# Patient Record
Sex: MALE | Race: WHITE | Employment: UNEMPLOYED | URBAN - METROPOLITAN AREA
[De-identification: names, ages, dates, MRNs, and addresses within clinical notes are randomized per-mention and may not be internally consistent; named-entity substitution may affect disease eponyms.]

---

## 2020-07-07 ENCOUNTER — HOSPITAL ENCOUNTER (EMERGENCY)
Age: 30
Discharge: HOME | End: 2020-07-07
Attending: EMERGENCY MEDICINE
Payer: COMMERCIAL

## 2020-07-07 VITALS
OXYGEN SATURATION: 99 % | DIASTOLIC BLOOD PRESSURE: 95 MMHG | WEIGHT: 170 LBS | RESPIRATION RATE: 16 BRPM | BODY MASS INDEX: 22.53 KG/M2 | HEART RATE: 99 BPM | SYSTOLIC BLOOD PRESSURE: 154 MMHG | HEIGHT: 73 IN | TEMPERATURE: 98.7 F

## 2020-07-07 PROCEDURE — 6370000000 HC RX 637 (ALT 250 FOR IP): Performed by: STUDENT IN AN ORGANIZED HEALTH CARE EDUCATION/TRAINING PROGRAM

## 2020-07-07 PROCEDURE — 99282 EMERGENCY DEPT VISIT SF MDM: CPT

## 2020-07-07 RX ORDER — ACETAMINOPHEN 500 MG
1000 TABLET ORAL EVERY 6 HOURS PRN
Qty: 30 TABLET | Refills: 0 | Status: SHIPPED | OUTPATIENT
Start: 2020-07-07 | End: 2022-03-07

## 2020-07-07 RX ORDER — SULFAMETHOXAZOLE AND TRIMETHOPRIM 800; 160 MG/1; MG/1
1 TABLET ORAL 2 TIMES DAILY
Qty: 14 TABLET | Refills: 0 | Status: SHIPPED | OUTPATIENT
Start: 2020-07-07 | End: 2020-07-14

## 2020-07-07 RX ORDER — SULFAMETHOXAZOLE AND TRIMETHOPRIM 800; 160 MG/1; MG/1
1 TABLET ORAL ONCE
Status: COMPLETED | OUTPATIENT
Start: 2020-07-07 | End: 2020-07-07

## 2020-07-07 RX ORDER — IBUPROFEN 600 MG/1
600 TABLET ORAL EVERY 6 HOURS PRN
Qty: 30 TABLET | Refills: 0 | Status: SHIPPED | OUTPATIENT
Start: 2020-07-07 | End: 2022-03-07

## 2020-07-07 RX ADMIN — SULFAMETHOXAZOLE AND TRIMETHOPRIM 1 TABLET: 800; 160 TABLET ORAL at 23:06

## 2020-07-07 ASSESSMENT — PAIN DESCRIPTION - LOCATION: LOCATION: WRIST

## 2020-07-07 ASSESSMENT — PAIN DESCRIPTION - PAIN TYPE: TYPE: ACUTE PAIN

## 2020-07-07 ASSESSMENT — PAIN SCALES - GENERAL: PAINLEVEL_OUTOF10: 5

## 2020-07-07 ASSESSMENT — PAIN DESCRIPTION - ORIENTATION: ORIENTATION: LEFT

## 2020-07-08 ASSESSMENT — ENCOUNTER SYMPTOMS
CHEST TIGHTNESS: 0
SHORTNESS OF BREATH: 0
EYE DISCHARGE: 0
ABDOMINAL PAIN: 0
EYE REDNESS: 0

## 2020-07-08 NOTE — ED PROVIDER NOTES
[facility]  ED  Emergency Department Encounter  Emergency Medicine Resident     Pt Name: Jluis Law  MRN: [id number]  [place] 1990  Date of evaluation: 7/8/20  PCP:  No primary care provider on file. CHIEF COMPLAINT       Chief Complaint   Patient presents with    Abscess     left wrist        HISTORY OFPRESENT ILLNESS  (Location/Symptom, Timing/Onset, Context/Setting, Quality, Duration, Modifying Joanna Loyola.)      Jluis Law is a 27 y.o. male who presents with history of IV drug use with concern for abscess to left wrist.  Patient states he last injected here approximately a day and a half ago. Noticed some swelling as well as redness to the area. No history of abscesses in the past.  Denies any possibility of any needle fragment. Pain is a 5 out of 10. Left wrist.  Throbbing. Nonradiating. Worse with movement. PAST MEDICAL / SURGICAL / SOCIAL / FAMILY HISTORY      has no past medical history on file. has no past surgical history on file. Social History     Socioeconomic History    Marital status: Single     Spouse name: Not on file    Number of children: Not on file    Years of education: Not on file    Highest education level: Not on file   Occupational History    Not on file   Social Needs    Financial resource strain: Not on file    Food insecurity     Worry: Not on file     Inability: Not on file    Transportation needs     Medical: Not on file     Non-medical: Not on file   Tobacco Use    Smoking status: Current Every Day Smoker    Smokeless tobacco: Never Used   Substance and Sexual Activity    Alcohol use:  Yes    Drug use: Yes     Types: Marijuana     Comment: heroin     Sexual activity: Not on file   Lifestyle    Physical activity     Days per week: Not on file     Minutes per session: Not on file    Stress: Not on file   Relationships    Social connections     Talks on phone: Not on file     Gets together: Not on file     Attends Islam service: Not on file     Active member of club or organization: Not on file     Attends meetings of clubs or organizations: Not on file     Relationship status: Not on file    Intimate partner violence     Fear of current or ex partner: Not on file     Emotionally abused: Not on file     Physically abused: Not on file     Forced sexual activity: Not on file   Other Topics Concern    Not on file   Social History Narrative    Not on file       History reviewed. No pertinent family history. Allergies:  Benzocaine    Home Medications:  Prior to Admission medications    Medication Sig Start Date End Date Taking? Authorizing Provider   acetaminophen (APAP EXTRA STRENGTH) 500 MG tablet Take 2 tablets by mouth every 6 hours as needed for Pain 7/7/20  Yes Kaylee Araujo, DO   ibuprofen (ADVIL;MOTRIN) 600 MG tablet Take 1 tablet by mouth every 6 hours as needed for Pain 7/7/20  Yes Lorvenkat Araujo, DO   sulfamethoxazole-trimethoprim (BACTRIM DS) 800-160 MG per tablet Take 1 tablet by mouth 2 times daily for 7 days 7/7/20 7/14/20 Yes Kaylee Araujo, DO       REVIEW OF SYSTEMS    (2-9 systems for level 4, 10 or more for level 5)      Review of Systems   Constitutional: Negative for chills and fever. Eyes: Negative for discharge and redness. Respiratory: Negative for chest tightness and shortness of breath. Cardiovascular: Negative for chest pain. Gastrointestinal: Negative for abdominal pain. Genitourinary: Negative for dysuria and flank pain. Musculoskeletal: Negative for myalgias. Skin: Positive for rash. Allergic/Immunologic: Positive for environmental allergies. Neurological: Negative for headaches. Psychiatric/Behavioral: Negative for agitation and confusion. PHYSICAL EXAM   (up to 7 for level 4, 8 or more for level 5)     INITIAL VITALS:    height is 6' 1\" (1.854 m) and weight is 170 lb (77.1 kg). His oral temperature is 98.7 °F (37.1 °C).  His blood pressure is 154/95 (abnormal) and his pulse is 99. His respiration is 16 and oxygen saturation is 99%. Physical Exam  Vitals signs and nursing note reviewed. Constitutional:       Appearance: He is well-developed. HENT:      Head: Normocephalic and atraumatic. Nose: Nose normal.      Mouth/Throat:      Mouth: Mucous membranes are moist.   Eyes:      General: No scleral icterus. Conjunctiva/sclera: Conjunctivae normal.      Pupils: Pupils are equal, round, and reactive to light. Neck:      Musculoskeletal: Neck supple. Trachea: No tracheal deviation. Cardiovascular:      Rate and Rhythm: Normal rate and regular rhythm. Heart sounds: Normal heart sounds. No murmur. No friction rub. No gallop. Pulmonary:      Effort: Pulmonary effort is normal. No respiratory distress. Breath sounds: Normal breath sounds. No wheezing or rales. Abdominal:      General: Bowel sounds are normal. There is no distension. Palpations: Abdomen is soft. There is no mass. Tenderness: There is no abdominal tenderness. There is no guarding or rebound. Musculoskeletal: Normal range of motion. Comments: Area of induration to  volar wrist.  No hand involvement. Swelling and erythema to mid forearm. No palpable fluctuance. Pulses 2/4. Cap refill less than 2 seconds. Full but painful range of motion. Very slight pain on passive stretch. Skin:     General: Skin is warm and dry. Findings: No erythema or rash. Neurological:      Mental Status: He is alert and oriented to person, place, and time. Psychiatric:         Behavior: Behavior normal.         DIFFERENTIAL  DIAGNOSIS     PLAN (LABS / IMAGING / EKG):  No orders of the defined types were placed in this encounter.       MEDICATIONS ORDERED:  Orders Placed This Encounter   Medications    sulfamethoxazole-trimethoprim (BACTRIM DS;SEPTRA DS) 800-160 MG per tablet 1 tablet    acetaminophen (APAP EXTRA STRENGTH) 500 MG tablet     Sig: Take 2 tablets by mouth every 6 hours as needed for Pain     Dispense:  30 tablet     Refill:  0    ibuprofen (ADVIL;MOTRIN) 600 MG tablet     Sig: Take 1 tablet by mouth every 6 hours as needed for Pain     Dispense:  30 tablet     Refill:  0    sulfamethoxazole-trimethoprim (BACTRIM DS) 800-160 MG per tablet     Sig: Take 1 tablet by mouth 2 times daily for 7 days     Dispense:  14 tablet     Refill:  0       DDX: Abscess versus cellulitis versus flexor tenosynovitis    Initial MDM/Plan: 27 y.o. male who presents with history of IV drug use with 1 day of swelling and redness to wrist with some induration without fluctuance. Suspicion for any acute surgical emergency such as flexor tenosynovial-itis is low at this time or to follow-up with a primary care doctor. But encourage patient to follow redness as well as pain very closely. Simple analgesics for pain. Bactrim for 7 days. Encouraged him to return emergency department for wound recheck. No incision and drainage. Ultrasound performed at bedside showing no fluid collection. DIAGNOSTIC RESULTS / EMERGENCY DEPARTMENT COURSE / MDM     LABS:  Labs Reviewed - No data to display      RADIOLOGY:  No results found. EMERGENCY DEPARTMENT COURSE:  · Based on the low acuity of concerning symptoms and improvement of symptoms, patient will be discharged with follow up and prescription information listed in the Disposition section. · Patient states they will follow-up with primary care physician and/or return to the emergency department should they experience a change or worsening of symptoms. · Patient will be discharged with resources: summary of visit, instructions, follow-up information, prescriptions if necessary and clinics available. · Patient/ family instructed to read discharge paperwork. All of their questions and concerns were addressed. · Suspicion for any acute life-threatening processes is low.    Patient voices understanding of plan.      PROCEDURES:  None    CONSULTS:  None    CRITICAL CARE:  Please see attending note    FINAL IMPRESSION      1. Cellulitis of left upper extremity          DISPOSITION / PLAN     DISPOSITION Decision To Discharge 07/07/2020 11:09:25 PM        PATIENTREFERRED TO:  No follow-up provider specified.     DISCHARGE MEDICATIONS:  Discharge Medication List as of 7/7/2020 11:25 PM      START taking these medications    Details   acetaminophen (APAP EXTRA STRENGTH) 500 MG tablet Take 2 tablets by mouth every 6 hours as needed for Pain, Disp-30 tablet, R-0Print      ibuprofen (ADVIL;MOTRIN) 600 MG tablet Take 1 tablet by mouth every 6 hours as needed for Pain, Disp-30 tablet, R-0Print      sulfamethoxazole-trimethoprim (BACTRIM DS) 800-160 MG per tablet Take 1 tablet by mouth 2 times daily for 7 days, Disp-14 tablet, R-0Print             Bib Haines DO  EmergencyMedicine Resident    (Please note that portions of this note were completed with a voice recognition program.  Efforts were made to edit the dictations but occasionally words are mis-transcribed.)       Bib Haines DO  Resident  07/08/20 0045       Bib Haines DO  Resident  07/08/20 4927

## 2020-07-08 NOTE — ED TRIAGE NOTES
Pt arrived to the ED with c/o left wrist abscess that started to bother him yesterday. Pt states he is a drug user with heroin. Pt is alert and oriented x4. Pt also states he has a mole on his foot that he is concerned about along with a rash that is around the abscess.

## 2020-07-15 NOTE — ED PROVIDER NOTES
[facility]     Emergency Department     Faculty Note/ Attestation      Pt Name: Beatrice Mendiola                                       MRN: [id number]  [place] 1990  Date of evaluation: 7/7/2020    Patients PCP:    No primary care provider on file. Attestation  I performed a history and physical examination of the patient and discussed management with the resident. I reviewed the residents note and agree with the documented findings and plan of care. Any areas of disagreement are noted on the chart. I was personally present for the key portions of any procedures. I have documented in the chart those procedures where I was not present during the key portions. I have reviewed the emergency nurses triage note. I agree with the chief complaint, past medical history, past surgical history, allergies, medications, social and family history as documented unless otherwise noted below. For Physician Assistant/ Nurse Practitioner cases/documentation I have personally evaluated this patient and have completed at least one if not all key elements of the E/M (history, physical exam, and MDM). Additional findings are as noted.       Initial Screens:        [place] Coma Scale  Eye Opening: Spontaneous  Best Verbal Response: Oriented  Best Motor Response: Obeys commands  Samina Coma Scale Score: 15    Vitals:    Vitals:    07/07/20 2246 07/07/20 2249   BP:  (!) 154/95   Pulse:  99   Resp: 16    Temp:  98.7 °F (37.1 °C)   TempSrc:  Oral   SpO2:  99%   Weight: 170 lb (77.1 kg)    Height: 6' 1\" (1.854 m)        CHIEF COMPLAINT       Chief Complaint   Patient presents with    Abscess     left wrist              DIAGNOSTIC RESULTS             RADIOLOGY:   No orders to display         LABS:  Labs Reviewed - No data to display      EMERGENCY DEPARTMENT COURSE:     -------------------------  BP: (!) 154/95, Temp: 98.7 °F (37.1 °C), Pulse: 99, Resp: 16      Comments    Cellulitis versus abscess, no drainable fluid collection. Will place him on Bactrim for 7 days, follow-up with PCP, given PCP resources and instruction to return to the ED with any worsening symptoms, worsening pain, signs of flexor tenosynovitis or any other concerns. Spent approximately 10 minutes discussing substance abuse treatment programs and offering him resources, he declined at this time and stated he had the resources and would seek them out when he was ready.     (Please note that portions of this note were completed with a voice recognition program.  Efforts were made to edit the dictations but occasionally words are mis-transcribed.)      Palma MD  Attending Emergency Physician         Abbi Puente MD  07/15/20 2321

## 2021-07-17 ENCOUNTER — HOSPITAL ENCOUNTER (EMERGENCY)
Age: 31
Discharge: HOME | End: 2021-07-17
Attending: EMERGENCY MEDICINE

## 2021-07-17 VITALS
OXYGEN SATURATION: 99 % | BODY MASS INDEX: 22.62 KG/M2 | WEIGHT: 167 LBS | DIASTOLIC BLOOD PRESSURE: 61 MMHG | HEART RATE: 89 BPM | SYSTOLIC BLOOD PRESSURE: 129 MMHG | HEIGHT: 72 IN | TEMPERATURE: 96.6 F | RESPIRATION RATE: 18 BRPM

## 2021-07-17 DIAGNOSIS — L03.115 CELLULITIS OF RIGHT LOWER EXTREMITY: Primary | ICD-10-CM

## 2021-07-17 PROCEDURE — 6370000000 HC RX 637 (ALT 250 FOR IP): Performed by: STUDENT IN AN ORGANIZED HEALTH CARE EDUCATION/TRAINING PROGRAM

## 2021-07-17 PROCEDURE — 99284 EMERGENCY DEPT VISIT MOD MDM: CPT

## 2021-07-17 RX ORDER — CEPHALEXIN 500 MG/1
500 CAPSULE ORAL 4 TIMES DAILY
Qty: 40 CAPSULE | Refills: 0 | Status: SHIPPED | OUTPATIENT
Start: 2021-07-17 | End: 2021-07-27

## 2021-07-17 RX ORDER — CEPHALEXIN 250 MG/1
500 CAPSULE ORAL ONCE
Status: COMPLETED | OUTPATIENT
Start: 2021-07-17 | End: 2021-07-17

## 2021-07-17 RX ADMIN — CEPHALEXIN 500 MG: 250 CAPSULE ORAL at 19:13

## 2021-07-17 ASSESSMENT — ENCOUNTER SYMPTOMS
COLOR CHANGE: 1
CONSTIPATION: 0
SHORTNESS OF BREATH: 0
DIARRHEA: 0
BACK PAIN: 0
NAUSEA: 0
RHINORRHEA: 0
VOMITING: 0
ABDOMINAL PAIN: 0

## 2021-07-17 ASSESSMENT — PAIN DESCRIPTION - ORIENTATION: ORIENTATION: RIGHT

## 2021-07-17 ASSESSMENT — PAIN DESCRIPTION - LOCATION: LOCATION: FOOT

## 2021-07-17 ASSESSMENT — PAIN DESCRIPTION - PAIN TYPE: TYPE: ACUTE PAIN

## 2021-07-17 ASSESSMENT — PAIN SCALES - GENERAL: PAINLEVEL_OUTOF10: 7

## 2021-07-17 NOTE — ED PROVIDER NOTES
[facility]  Emergency Department  Faculty Attestation     I performed a history and physical examination of the patient and discussed management with the resident. I reviewed the residents note and agree with the documented findings and plan of care. Any areas of disagreement are noted on the chart. I was personally present for the key portions of any procedures. I have documented in the chart those procedures where I was not present during the key portions. I have reviewed the emergency nurses triage note. I agree with the chief complaint, past medical history, past surgical history, allergies, medications, social and family history as documented unless otherwise noted below. For Physician Assistant/ Nurse Practitioner cases/documentation I have personally evaluated this patient and have completed at least one if not all key elements of the E/M (history, physical exam, and MDM). Additional findings are as noted. Primary Care Physician:  No primary care provider on file. Screenings:  [unfilled]    CHIEF COMPLAINT       Chief Complaint   Patient presents with    Cellulitis     possible cellulitis to the right foot. foot is red, swollen, painful       RECENT VITALS:   Temp: 96.6 °F (35.9 °C),  Pulse: 89, Resp: 18, BP: 129/61    LABS:  Labs Reviewed - No data to display    Radiology  No orders to display       Attending Physician Additional  Notes    Has discomfort and redness in both lower extremities especially the right lower extremity. No fever chills or sweats. Is a daily heroin injector but does not inject his feet. He uses about a gram a day mixed with fentanyl. He follows regularly for the needle exchange program and gets tested for HIV and hepatitis and has been seronegative thus far. No pain in the bottom of his feet. No diabetes. No trauma to the legs. On exam he is currently afebrile vital signs normal.  Appears nontoxic. No murmur noted. There is erythema with warmth of right more so than the left lower extremity. There is swelling around the ankle but no pain with ankle movement. No pain with ankle with toe movement. Normal pulses and capillary refill. There are multiple erythematous round lesions some of which appear to have had early blister formation. Above these and on his arms he has small areas of hyperpigmented macules approximately 5 mm in size. Impression is possible cellulitis, consider dermatologic disorder additionally, no evidence of DVT, septic arthritis, necrotizing fasciitis. Plan is Keflex, early follow-up, Tylenol, return if worsening symptoms or new complaints. Shanika Rossi.  Trenton Salinas MD, [facility]  Attending Emergency  Physician               Glenda Valiente MD  07/17/21 9812

## 2021-07-17 NOTE — ED PROVIDER NOTES
[facility]  ED  Emergency Department Encounter  Emergency Medicine Resident     Pt Name: Alexa Bailey  MRN: [id number]  [place] 1990  Date of evaluation: 7/17/21  PCP:  No primary care provider on file. CHIEF COMPLAINT       Chief Complaint   Patient presents with    Cellulitis     possible cellulitis to the right foot. foot is red, swollen, painful       HISTORY OFPRESENT ILLNESS  (Location/Symptom, Timing/Onset, Context/Setting, Quality, Duration, Modifying Factors,Severity.)      Alexa Bailey is a 32 y.o. male who presents with swelling and redness in the right lower extremity. Patient states it started swelling 2 days ago, but doubled in size within the last hour. Patient uses fentanyl daily by report, but denies ever injecting in his feet or legs. Redness is mostly around the lateral ankle and spreading up the lower leg slightly. He denies any fevers or chills. No chest pain, shortness of breath, abdominal pain, nausea, vomiting. He has tried programs to quit drugs in the past and is not interested in this time. He states he goes to a needle exchange every 2 weeks and gets tested for hep A, B, C, and HIV. No redness or swelling in the arms by report. Patient last used fentanyl around noon today. He has had abscesses requiring antibiotics in the past.  No other past medical history, no history of diabetes, no other complaints at this time. PAST MEDICAL / SURGICAL / SOCIAL / FAMILY HISTORY      has no past medical history on file. has no past surgical history on file. Social History     Socioeconomic History    Marital status: Single     Spouse name: Not on file    Number of children: Not on file    Years of education: Not on file    Highest education level: Not on file   Occupational History    Not on file   Tobacco Use    Smoking status: Current Every Day Smoker    Smokeless tobacco: Never Used   Substance and Sexual Activity    Alcohol use:  Yes    Drug use: Yes     Types: Marijuana     Comment: heroin     Sexual activity: Not on file   Other Topics Concern    Not on file   Social History Narrative    Not on file     Social Determinants of Health     Financial Resource Strain:     Difficulty of Paying Living Expenses:    Food Insecurity:     Worried About Running Out of Food in the Last Year:     [address] in the Last Year:    Transportation Needs:     Lack of Transportation (Medical):  Lack of Transportation (Non-Medical):    Physical Activity:     Days of Exercise per Week:     Minutes of Exercise per Session:    Stress:     Feeling of Stress :    Social Connections:     Frequency of Communication with Friends and Family:     Frequency of Social Gatherings with Friends and Family:     Attends Hindu Services:     Active Member of Clubs or Organizations:     Attends Club or Organization Meetings:     Marital Status:    Intimate Partner Violence:     Fear of Current or Ex-Partner:     Emotionally Abused:     Physically Abused:     Sexually Abused:        History reviewed. No pertinent family history. Allergies:  Benzocaine    Home Medications:  Prior to Admission medications    Medication Sig Start Date End Date Taking? Authorizing Provider   cephALEXin (KEFLEX) 500 MG capsule Take 1 capsule by mouth 4 times daily for 10 days 7/17/21 7/27/21 Yes Diane Ruano, DO   acetaminophen (APAP EXTRA STRENGTH) 500 MG tablet Take 2 tablets by mouth every 6 hours as needed for Pain 7/7/20   Maurilio Bartolome, DO   ibuprofen (ADVIL;MOTRIN) 600 MG tablet Take 1 tablet by mouth every 6 hours as needed for Pain 7/7/20   Maurilio Bartolome, DO       REVIEW OFSYSTEMS    (2-9 systems for level 4, 10 or more for level 5)      Review of Systems   Constitutional: Negative for chills and fever. HENT: Negative for congestion and rhinorrhea. Eyes: Negative for visual disturbance. Respiratory: Negative for shortness of breath. Neurological:      General: No focal deficit present. Mental Status: He is oriented to person, place, and time. DIFFERENTIAL  DIAGNOSIS     PLAN (LABS / IMAGING / EKG):  No orders of the defined types were placed in this encounter. MEDICATIONS ORDERED:  Orders Placed This Encounter   Medications    cephALEXin (KEFLEX) capsule 500 mg     Order Specific Question:   Antimicrobial Indications     Answer:   Skin and Soft Tissue Infection    cephALEXin (KEFLEX) 500 MG capsule     Sig: Take 1 capsule by mouth 4 times daily for 10 days     Dispense:  40 capsule     Refill:  0       Initial MDM/Plan/ED COURSE:    32 y.o. male who presents with right lower extremity swelling, pain, redness. This began 2 days ago but significantly worsened within the last few hours. Patient uses fentanyl daily but denies any injection into the lower extremities. No other medical history, patient denies history of hepatitis C or HIV. On exam, patient is drowsy, appears to be under the influence. Vitals otherwise within normal limits and he is afebrile. Right lower extremity is edematous up to the knee, with erythema surrounding the ankle joint, most prevalent at the lateral aspect. Pulses intact. Left lower extremity appears somewhat edematous as well, with patches of erythema and hyperpigmentation in both legs. Discussed possible admission for IV antibiotics and patient stated he would not stay for this. He also goes to needle exchange every other week and is tested for hepatitis C and HIV. As patient will not agree to stay, low concern for DVT at this time, and no systemic symptoms, will forego labs at this time. Patient was instructed on the importance of completing antibiotic course. Return precautions given. Patient discharged on Keflex.      :     DIAGNOSTIC RESULTS / EMERGENCYDEPARTMENT COURSE / MDM     LABS:  Labs Reviewed - No data to display        No results found. EKG  Not indicated.      All EKG's are interpreted by the Emergency Department Physicianwho either signs or Co-signs this chart in the absence of a cardiologist.      PROCEDURES:  None    CONSULTS:  None    CRITICAL CARE:  Please see attending note    FINAL IMPRESSION      1.  Cellulitis of right lower extremity          DISPOSITION / PLAN     DISPOSITION Decision To Discharge 07/17/2021 07:15:03 PM      PATIENT REFERRED TO:  [facility] ED  [address] [place] [address]  [phone]    If symptoms worsen      DISCHARGE MEDICATIONS:  Discharge Medication List as of 7/17/2021  7:17 PM      START taking these medications    Details   cephALEXin (KEFLEX) 500 MG capsule Take 1 capsule by mouth 4 times daily for 10 days, Disp-40 capsule, R-0Print             Sixto Rothman DO  Emergency Medicine Resident    (Please note that portions of this note were completed with a voice recognition program.Efforts were made to edit the dictations but occasionally words are mis-transcribed.)       Sixto Rothman DO  Resident  07/17/21 3661

## 2021-08-25 ENCOUNTER — HOSPITAL ENCOUNTER (EMERGENCY)
Age: 31
Discharge: LEFT BEFORE DISCHARGE | End: 2021-08-25

## 2021-08-25 VITALS
HEIGHT: 72 IN | TEMPERATURE: 99 F | HEART RATE: 98 BPM | RESPIRATION RATE: 18 BRPM | BODY MASS INDEX: 22.35 KG/M2 | OXYGEN SATURATION: 99 % | SYSTOLIC BLOOD PRESSURE: 114 MMHG | DIASTOLIC BLOOD PRESSURE: 78 MMHG | WEIGHT: 165 LBS

## 2021-08-25 ASSESSMENT — PAIN DESCRIPTION - ONSET: ONSET: ON-GOING

## 2021-08-25 ASSESSMENT — PAIN DESCRIPTION - FREQUENCY: FREQUENCY: CONTINUOUS

## 2021-08-25 ASSESSMENT — PAIN DESCRIPTION - DESCRIPTORS: DESCRIPTORS: PINS AND NEEDLES;ACHING

## 2021-08-25 ASSESSMENT — PAIN DESCRIPTION - LOCATION: LOCATION: HAND

## 2021-08-25 ASSESSMENT — PAIN DESCRIPTION - PAIN TYPE: TYPE: ACUTE PAIN

## 2021-08-25 ASSESSMENT — PAIN SCALES - GENERAL: PAINLEVEL_OUTOF10: 10

## 2021-08-25 ASSESSMENT — PAIN DESCRIPTION - PROGRESSION: CLINICAL_PROGRESSION: RAPIDLY WORSENING

## 2021-08-26 ENCOUNTER — APPOINTMENT (OUTPATIENT)
Dept: GENERAL RADIOLOGY | Age: 31
DRG: 603 | End: 2021-08-26

## 2021-08-26 ENCOUNTER — HOSPITAL ENCOUNTER (INPATIENT)
Age: 31
LOS: 1 days | Discharge: LEFT BEFORE DISCHARGE | DRG: 603 | End: 2021-08-27
Attending: EMERGENCY MEDICINE

## 2021-08-26 DIAGNOSIS — L03.114 CELLULITIS OF LEFT UPPER EXTREMITY: Primary | ICD-10-CM

## 2021-08-26 PROCEDURE — 99285 EMERGENCY DEPT VISIT HI MDM: CPT

## 2021-08-26 PROCEDURE — 85025 COMPLETE CBC W/AUTO DIFF WBC: CPT

## 2021-08-26 PROCEDURE — 73130 X-RAY EXAM OF HAND: CPT

## 2021-08-26 PROCEDURE — 73110 X-RAY EXAM OF WRIST: CPT

## 2021-08-26 PROCEDURE — 80048 BASIC METABOLIC PNL TOTAL CA: CPT

## 2021-08-26 PROCEDURE — 85652 RBC SED RATE AUTOMATED: CPT

## 2021-08-26 PROCEDURE — 86140 C-REACTIVE PROTEIN: CPT

## 2021-08-26 ASSESSMENT — PAIN SCALES - GENERAL: PAINLEVEL_OUTOF10: 8

## 2021-08-26 ASSESSMENT — PAIN DESCRIPTION - PAIN TYPE: TYPE: ACUTE PAIN

## 2021-08-26 ASSESSMENT — PAIN DESCRIPTION - LOCATION: LOCATION: WRIST;FINGER (COMMENT WHICH ONE)

## 2021-08-26 ASSESSMENT — PAIN DESCRIPTION - ORIENTATION: ORIENTATION: LEFT

## 2021-08-26 ASSESSMENT — PAIN DESCRIPTION - FREQUENCY: FREQUENCY: CONTINUOUS

## 2021-08-26 ASSESSMENT — PAIN DESCRIPTION - DESCRIPTORS: DESCRIPTORS: ACHING

## 2021-08-26 ASSESSMENT — PAIN DESCRIPTION - ONSET: ONSET: ON-GOING

## 2021-08-26 NOTE — ED NOTES
Pt presents to the ED with c/o of left arm swelling/pain. Pt is IV drug user and noticed abscess \"a few weeks ago\"   Pt states he injected cocaine into his arm first so it was numb and then injected heroine. Pt states his last use was this morning. Pt has slightly decreased sensation to left upper extremity but is able to move fingers with pain.    Pt has scattered scabs to left hand and a larger open sore to anterior wrist.   Pt states pain is 8/10  Pt denies other c/o   AxO x4, NAD      Erich Cruz RN  08/25/21 9150

## 2021-08-27 VITALS
DIASTOLIC BLOOD PRESSURE: 82 MMHG | TEMPERATURE: 98.4 F | WEIGHT: 165 LBS | SYSTOLIC BLOOD PRESSURE: 108 MMHG | BODY MASS INDEX: 22.35 KG/M2 | HEART RATE: 77 BPM | RESPIRATION RATE: 17 BRPM | OXYGEN SATURATION: 97 % | HEIGHT: 72 IN

## 2021-08-27 PROBLEM — L03.114 CELLULITIS OF LEFT ARM: Status: ACTIVE | Noted: 2021-08-27

## 2021-08-27 PROBLEM — F19.20 POLYSUBSTANCE DEPENDENCE INCLUDING OPIOID DRUG WITH DAILY USE (HCC): Status: ACTIVE | Noted: 2021-08-27

## 2021-08-27 PROBLEM — R79.82 ELEVATED C-REACTIVE PROTEIN (CRP): Status: ACTIVE | Noted: 2021-08-27

## 2021-08-27 PROBLEM — F11.20 POLYSUBSTANCE DEPENDENCE INCLUDING OPIOID DRUG WITH DAILY USE (HCC): Status: ACTIVE | Noted: 2021-08-27

## 2021-08-27 PROBLEM — D64.9 NORMOCYTIC NORMOCHROMIC ANEMIA: Status: ACTIVE | Noted: 2021-08-27

## 2021-08-27 LAB
ABSOLUTE EOS #: 0.25 K/UL (ref 0–0.44)
ABSOLUTE IMMATURE GRANULOCYTE: 0.04 K/UL (ref 0–0.3)
ABSOLUTE LYMPH #: 2.39 K/UL (ref 1.1–3.7)
ABSOLUTE MONO #: 0.86 K/UL (ref 0.1–1.2)
ANION GAP SERPL CALCULATED.3IONS-SCNC: 12 MMOL/L (ref 9–17)
BASOPHILS # BLD: 0 % (ref 0–2)
BASOPHILS ABSOLUTE: <0.03 K/UL (ref 0–0.2)
BUN BLDV-MCNC: 13 MG/DL (ref 6–20)
BUN/CREAT BLD: ABNORMAL (ref 9–20)
C-REACTIVE PROTEIN: 70.2 MG/L (ref 0–5)
CALCIUM SERPL-MCNC: 8.9 MG/DL (ref 8.6–10.4)
CHLORIDE BLD-SCNC: 98 MMOL/L (ref 98–107)
CO2: 26 MMOL/L (ref 20–31)
CREAT SERPL-MCNC: 1.14 MG/DL (ref 0.7–1.2)
DIFFERENTIAL TYPE: ABNORMAL
EOSINOPHILS RELATIVE PERCENT: 2 % (ref 1–4)
GFR AFRICAN AMERICAN: >60 ML/MIN
GFR NON-AFRICAN AMERICAN: >60 ML/MIN
GFR SERPL CREATININE-BSD FRML MDRD: ABNORMAL ML/MIN/{1.73_M2}
GFR SERPL CREATININE-BSD FRML MDRD: ABNORMAL ML/MIN/{1.73_M2}
GLUCOSE BLD-MCNC: 91 MG/DL (ref 70–99)
HCT VFR BLD CALC: 35.6 % (ref 40.7–50.3)
HEMOGLOBIN: 10.8 G/DL (ref 13–17)
IMMATURE GRANULOCYTES: 0 %
LYMPHOCYTES # BLD: 19 % (ref 24–43)
MCH RBC QN AUTO: 25.5 PG (ref 25.2–33.5)
MCHC RBC AUTO-ENTMCNC: 30.3 G/DL (ref 28.4–34.8)
MCV RBC AUTO: 84.2 FL (ref 82.6–102.9)
MONOCYTES # BLD: 7 % (ref 3–12)
NRBC AUTOMATED: 0 PER 100 WBC
PDW BLD-RTO: 13.6 % (ref 11.8–14.4)
PLATELET # BLD: 436 K/UL (ref 138–453)
PLATELET ESTIMATE: ABNORMAL
PMV BLD AUTO: 9.4 FL (ref 8.1–13.5)
POTASSIUM SERPL-SCNC: 3.6 MMOL/L (ref 3.7–5.3)
RBC # BLD: 4.23 M/UL (ref 4.21–5.77)
RBC # BLD: ABNORMAL 10*6/UL
SEDIMENTATION RATE, ERYTHROCYTE: 70 MM (ref 0–15)
SEG NEUTROPHILS: 72 % (ref 36–65)
SEGMENTED NEUTROPHILS ABSOLUTE COUNT: 9 K/UL (ref 1.5–8.1)
SODIUM BLD-SCNC: 136 MMOL/L (ref 135–144)
WBC # BLD: 12.6 K/UL (ref 3.5–11.3)
WBC # BLD: ABNORMAL 10*3/UL

## 2021-08-27 PROCEDURE — 6360000002 HC RX W HCPCS: Performed by: STUDENT IN AN ORGANIZED HEALTH CARE EDUCATION/TRAINING PROGRAM

## 2021-08-27 PROCEDURE — 2580000003 HC RX 258: Performed by: STUDENT IN AN ORGANIZED HEALTH CARE EDUCATION/TRAINING PROGRAM

## 2021-08-27 PROCEDURE — 99222 1ST HOSP IP/OBS MODERATE 55: CPT | Performed by: NURSE PRACTITIONER

## 2021-08-27 PROCEDURE — 2580000003 HC RX 258: Performed by: NURSE PRACTITIONER

## 2021-08-27 PROCEDURE — 6360000002 HC RX W HCPCS: Performed by: NURSE PRACTITIONER

## 2021-08-27 PROCEDURE — 1200000000 HC SEMI PRIVATE

## 2021-08-27 RX ORDER — MAGNESIUM SULFATE 1 G/100ML
1000 INJECTION INTRAVENOUS PRN
Status: DISCONTINUED | OUTPATIENT
Start: 2021-08-27 | End: 2021-08-27 | Stop reason: HOSPADM

## 2021-08-27 RX ORDER — SODIUM CHLORIDE 0.9 % (FLUSH) 0.9 %
10 SYRINGE (ML) INJECTION PRN
Status: DISCONTINUED | OUTPATIENT
Start: 2021-08-27 | End: 2021-08-27 | Stop reason: HOSPADM

## 2021-08-27 RX ORDER — POLYETHYLENE GLYCOL 3350 17 G/17G
17 POWDER, FOR SOLUTION ORAL DAILY PRN
Status: DISCONTINUED | OUTPATIENT
Start: 2021-08-27 | End: 2021-08-27 | Stop reason: HOSPADM

## 2021-08-27 RX ORDER — SODIUM CHLORIDE 0.9 % (FLUSH) 0.9 %
5-40 SYRINGE (ML) INJECTION EVERY 12 HOURS SCHEDULED
Status: DISCONTINUED | OUTPATIENT
Start: 2021-08-27 | End: 2021-08-27 | Stop reason: HOSPADM

## 2021-08-27 RX ORDER — ACETAMINOPHEN 325 MG/1
650 TABLET ORAL EVERY 6 HOURS PRN
Status: DISCONTINUED | OUTPATIENT
Start: 2021-08-27 | End: 2021-08-27 | Stop reason: HOSPADM

## 2021-08-27 RX ORDER — POTASSIUM CHLORIDE 7.45 MG/ML
10 INJECTION INTRAVENOUS PRN
Status: DISCONTINUED | OUTPATIENT
Start: 2021-08-27 | End: 2021-08-27 | Stop reason: HOSPADM

## 2021-08-27 RX ORDER — ONDANSETRON 2 MG/ML
4 INJECTION INTRAMUSCULAR; INTRAVENOUS EVERY 6 HOURS PRN
Status: DISCONTINUED | OUTPATIENT
Start: 2021-08-27 | End: 2021-08-27 | Stop reason: HOSPADM

## 2021-08-27 RX ORDER — SODIUM CHLORIDE 9 MG/ML
25 INJECTION, SOLUTION INTRAVENOUS PRN
Status: DISCONTINUED | OUTPATIENT
Start: 2021-08-27 | End: 2021-08-27 | Stop reason: HOSPADM

## 2021-08-27 RX ORDER — VANCOMYCIN HYDROCHLORIDE 1 G/200ML
15 INJECTION, SOLUTION INTRAVENOUS ONCE
Status: COMPLETED | OUTPATIENT
Start: 2021-08-27 | End: 2021-08-27

## 2021-08-27 RX ORDER — ONDANSETRON 4 MG/1
4 TABLET, ORALLY DISINTEGRATING ORAL EVERY 8 HOURS PRN
Status: DISCONTINUED | OUTPATIENT
Start: 2021-08-27 | End: 2021-08-27 | Stop reason: HOSPADM

## 2021-08-27 RX ORDER — VANCOMYCIN HYDROCHLORIDE 1 G/200ML
15 INJECTION, SOLUTION INTRAVENOUS EVERY 12 HOURS
Status: DISCONTINUED | OUTPATIENT
Start: 2021-08-27 | End: 2021-08-27 | Stop reason: HOSPADM

## 2021-08-27 RX ORDER — ACETAMINOPHEN 650 MG/1
650 SUPPOSITORY RECTAL EVERY 6 HOURS PRN
Status: DISCONTINUED | OUTPATIENT
Start: 2021-08-27 | End: 2021-08-27 | Stop reason: HOSPADM

## 2021-08-27 RX ORDER — POTASSIUM CHLORIDE 20 MEQ/1
40 TABLET, EXTENDED RELEASE ORAL PRN
Status: DISCONTINUED | OUTPATIENT
Start: 2021-08-27 | End: 2021-08-27 | Stop reason: HOSPADM

## 2021-08-27 RX ORDER — SODIUM CHLORIDE 9 MG/ML
INJECTION, SOLUTION INTRAVENOUS CONTINUOUS
Status: DISCONTINUED | OUTPATIENT
Start: 2021-08-27 | End: 2021-08-27 | Stop reason: HOSPADM

## 2021-08-27 RX ADMIN — VANCOMYCIN HYDROCHLORIDE 1000 MG: 1 INJECTION, SOLUTION INTRAVENOUS at 01:27

## 2021-08-27 RX ADMIN — SODIUM CHLORIDE: 9 INJECTION, SOLUTION INTRAVENOUS at 03:28

## 2021-08-27 RX ADMIN — CEFTRIAXONE SODIUM 1000 MG: 1 INJECTION, POWDER, FOR SOLUTION INTRAMUSCULAR; INTRAVENOUS at 00:54

## 2021-08-27 RX ADMIN — PIPERACILLIN AND TAZOBACTAM 3375 MG: 3; .375 INJECTION, POWDER, FOR SOLUTION INTRAVENOUS at 03:38

## 2021-08-27 ASSESSMENT — ENCOUNTER SYMPTOMS
RHINORRHEA: 0
WHEEZING: 0
BLOOD IN STOOL: 0
COLOR CHANGE: 1
CONSTIPATION: 0
NAUSEA: 0
ABDOMINAL PAIN: 0
DIARRHEA: 0
VOMITING: 0
SHORTNESS OF BREATH: 0
SORE THROAT: 0

## 2021-08-27 ASSESSMENT — PAIN SCALES - GENERAL: PAINLEVEL_OUTOF10: 6

## 2021-08-27 NOTE — ED NOTES
Pt being transported [facility] via wheelchair by Waleska Rivera. Pt in NAD.       [address]25 Ned Aaron, RN  08/27/21 1363

## 2021-08-27 NOTE — PROGRESS NOTES
Pt called out stated that he wanted to leave because he is already withdrawing. Juno Casiano came to bedside and talked to patient about risks of leaving. Pt still decided to sign AMA paperwork. IV taken out and patient left.

## 2021-08-27 NOTE — ED NOTES
Writer attempted report to 3B RN, RN unavailable at this time for report. Writer to call back.       Zi Monday, RN  08/27/21 7373

## 2021-08-27 NOTE — ED PROVIDER NOTES
[facility]     Emergency Department     Faculty Attestation    I performed a history and physical examination of the patient and discussed management with the resident. I have reviewed and agree with the residents findings including all diagnostic interpretations, and treatment plans as written. Any areas of disagreement are noted on the chart. I was personally present for the key portions of any procedures. I have documented in the chart those procedures where I was not present during the key portions. I have reviewed the emergency nurses triage note. I agree with the chief complaint, past medical history, past surgical history, allergies, medications, social and family history as documented unless otherwise noted below. Documentation of the HPI, Physical Exam and Medical Decision Making performed by kadie is based on my personal performance of the HPI, PE and MDM. For Physician Assistant/ Nurse Practitioner cases/documentation I have personally evaluated this patient and have completed at least one if not all key elements of the E/M (history, physical exam, and MDM). Additional findings are as noted. Patient with pain and swelling and redness to his left wrist.  States he has a history of IV heroin use and last used to that site about a month ago. And states that 2 days ago he was walking his dog when the leash pulled around his wrist and now he is having increased pain and swelling. Patient denies any fevers or chills, he does continue to use IV drugs, but has not used again to his left arm since 1 month ago. Patient with significant edema and erythema and tenderness to palpation to the radial aspect of his left wrist.  With fluctuance noted. There is lymphangitic streaking noted to the volar aspect of the forearm. Patient does have pain to the thenar eminence. He is able to flex and extend fully at the thumb.   But pain with doing so capillary refill of less than 2 seconds    Patient with cellulitis and abscess to left wrist.  With lymphangitic streaking. We will plan on labs, blood work rule out foreign body with possible needle given appearance of wound as well as streaking patient likely to require admission with IV antibiotics and monitoring.   Wound was outlined and borders were demarcated    Dean Sorenson D.O, M.P.H  Attending Emergency Medicine Physician         Dean Sorenson,   08/27/21 0000

## 2021-08-27 NOTE — PROGRESS NOTES
Pharmacy Note  Vancomycin Consult    Tanner Deleon is a 32 y.o. male started on Vancomycin for cellulitis; consult received from Dr. Kenny Dowell to manage therapy. Also receiving the following antibiotics: zosyn. Patient Active Problem List   Diagnosis    Cellulitis of left arm     Allergies:  Benzocaine     Temp max: 98.4 F  (36.9 C)    Recent Labs     08/26/21  0018   BUN 13   CREATININE 1.14   WBC 12.6*     No intake or output data in the 24 hours ending 08/27/21 0305  Culture Date      Source                       Results       Ht Readings from Last 1 Encounters:   08/26/21 6' (1.829 m)        Wt Readings from Last 1 Encounters:   08/26/21 165 lb (74.8 kg)       Body mass index is 22.38 kg/m². Estimated Creatinine Clearance: 99 mL/min (based on SCr of 1.14 mg/dL). Goal of AUC:  400-600 mg/L.hr  Goal Trough Level: 10-18 mcg/mL    Assessment/Plan:  Will initiate Vancomycin with  1000 mg IV every 12 hours. Timing of trough level will be determined based on culture results, renal function, and clinical response. Thank you for the consult. Will continue to follow.

## 2021-08-27 NOTE — ED TRIAGE NOTES
Pt with cc of L wrist pain and swelling. Per pt over 1 month ago injected heroin into vein and missed but thinks he hit a tendon. Wrist was fine with minimal pain however, 2 days ago was walking dog and leash pulled tight around wrist and now wrist is swollen red and increased pain. Pt still has sensation to all fingers +pulses , limited ROM due to swelling.      Pt last IV drug use earlier today heroin and fentanyl

## 2021-08-27 NOTE — H&P
[facility]  Office: [address], DO, Bia , DO, Paulie Patel, DO, Angel Stanley Blood, DO, Dipika Dixon MD, Jim Delvalle MD, Leonardo Rapp MD, Timothy Lomeli MD, Darby Rios MD, Luis Alfredo Mariscal MD, Mireya Villalba MD, Meliton Babcock, DO, Amy Willis MD, Richard Gates DO, Lauryn Daniels MD,  Michael Mir, DO, Kang Pascual MD, Skyla Vega MD, Debra Rodarte MD, Hope Opitz, MD, Coleen Aguero MD, Kellie Magallanes MD, Raul Grimes MD, Otis Millan, [facility], [facility], CNP, Lit Salinas, CNP, Zehra Kiser, CNS, Oscar Manrique, CNP, Rashmi Carrillo, CNP, Tico Hoyt, CNP, Marcial Gates, CNP, Janie Mcnamara, CNP, Sabrina Najera PA-C, Xavi Hilliard, [facility], Arley Goodwin, CNP, Elías Henriquez, CNP, Gabino Angulo, CNP, Keri Sandoval, CNP, Barbra Prader, CNP, José Temple, CNP, Jimena Santiago, [facility], [facility]    HISTORY AND PHYSICAL EXAMINATION            Date:   8/27/2021  Patient name:  Jatin Linn  Date of admission:  8/26/2021 11:03 PM  MRN:   [id number]  Account:  [de-identified]  YOB: 1990  PCP:    No primary care provider on file. Room:   [address] [place] [address]  Code Status:    Full Code    Chief Complaint:     Chief Complaint   Patient presents with    Arm Pain    Arm Swelling       History Obtained From:     patient, electronic medical record    History of Present Illness:     Jatin Linn is a 32 y.o. Declined male who presents with Arm Pain and Arm Swelling   and is admitted to the hospital for the management of Cellulitis of left arm. Patient is a 24-year-old male with no real significant past medical history who presented to the emergency department for worsening left wrist pain, redness and swelling. Per patient he had recently used IV drugs to the area.   He then states that his dog's leash got caught around his wrist resulting in the erythema and edema with warmth which was concerning for him. He presented to the emergency  department for evaluation where left wrist and hand x-rays were completed soft tissue swelling was identified without osseous abnormalities. Metabolic and hematologic abnormalities included normocytic normochromic anemia 83.0, neutrophilic leukocytosis and an elevated CRP and sed rate. Patient was admitted to [facility] floor and started on empiric antibiotics, blood cultures x2 obtained and pending. On assessment patient is awake and alert oriented x3 stating that he wants to leave the hospital because he wants to use again. Refusing lab work or further diagnostic interventions. Patient was strongly encouraged to remain in the hospital to continue antibiotic treatment and further work-up. States that he does not want to stay in the hospital.  Nurse was in room at time of discussion. AMA paperwork given to patient    Past Medical History:     Past Medical History:   Diagnosis Date    Polysubstance abuse [facility])         Past Surgical History:     No past surgical history on file. Medications Prior to Admission:     Prior to Admission medications    Medication Sig Start Date End Date Taking? Authorizing Provider   acetaminophen (APAP EXTRA STRENGTH) 500 MG tablet Take 2 tablets by mouth every 6 hours as needed for Pain 7/7/20   Mancil Quince, DO   ibuprofen (ADVIL;MOTRIN) 600 MG tablet Take 1 tablet by mouth every 6 hours as needed for Pain 7/7/20   Joshuacil Quince, DO        Allergies:     Benzocaine    Social History:     Tobacco:    reports that he has been smoking. He has been smoking about 1.00 pack per day. He has never used smokeless tobacco.  Alcohol:      reports current alcohol use. Drug Use:  reports current drug use. Drugs: Marijuana, Cocaine, and IV. Family History:     Patient states no family history    Review of Systems:     Positive and Negative as described in HPI.     CONSTITUTIONAL:  negative for fevers, chills, +sweats, fatigue, weight loss  HEENT:  negative for vision, hearing changes, runny nose, throat pain  RESPIRATORY:  negative for shortness of breath, cough, congestion, wheezing  CARDIOVASCULAR:  negative for chest pain, palpitations  GASTROINTESTINAL:  negative for nausea, vomiting, diarrhea, constipation, change in bowel habits, abdominal pain   GENITOURINARY:  negative for difficulty of urination, burning with urination, frequency   INTEGUMENT: Positive left upper extremity erythema edema  HEMATOLOGIC/LYMPHATIC:  + LUE swelling/edema   ALLERGIC/IMMUNOLOGIC:  negative for urticaria , itching  ENDOCRINE:  negative increase in drinking, increase in urination, hot or cold intolerance  MUSCULOSKELETAL:  negative joint pains, muscle aches, swelling of joints  NEUROLOGICAL:  negative for headaches, dizziness, lightheadedness, numbness, pain, tingling extremities  BEHAVIOR/PSYCH:  negative for depression, anxiety    Physical Exam:   /82   Pulse 77   Temp 98.4 °F (36.9 °C)   Resp 17   Ht 6' (1.829 m)   Wt 165 lb (74.8 kg)   SpO2 97%   BMI 22.38 kg/m²   Temp (24hrs), Av.5 °F (36.9 °C), Min:98.4 °F (36.9 °C), Max:98.8 °F (37.1 °C)    No results for input(s): POCGLU in the last 72 hours.   No intake or output data in the 24 hours ending 21 0923    General Appearance: Anxious appearing, disheveled  Mental status: oriented to person, place, and time  Head: normocephalic, atraumatic  Eye: no icterus, redness, pupils equal and reactive, extraocular eye movements intact, conjunctiva clear  Ear: normal external ear, no discharge, hearing intact  Nose: no drainage noted  Mouth: mucous membranes moist  Neck: supple, no carotid bruits, thyroid not palpable  Lungs: Bilateral equal air entry, clear to ausculation, no wheezing, rales or rhonchi, normal effort  Cardiovascular: normal rate, regular rhythm, no murmur, gallop, rub  Abdomen: Soft, nontender, nondistended, normal bowel sounds, no hepatomegaly or splenomegaly  Neurologic: There are no new focal motor or sensory deficits, normal muscle tone and bulk, no abnormal sensation, normal speech, cranial nerves II through XII grossly intact  Skin: Left upper extremity cellulitis with an area of induration around the wrist and associated distal hand swelling  Extremities: peripheral pulses palpable, no pedal edema or calf pain with palpation  Psych: Anxious    Investigations:      Laboratory Testing:  No results found for this or any previous visit (from the past 24 hour(s)). Imaging/Diagnostics:  XR WRIST LEFT (MIN 3 VIEWS)    Result Date: 8/27/2021  Marked soft tissue swelling. No osseous abnormality evident. XR HAND LEFT (MIN 3 VIEWS)    Result Date: 8/27/2021  Soft tissue edema without acute osseous abnormality. Assessment :      Hospital Problems         Last Modified POA    * (Principal) Cellulitis of left arm 8/27/2021 Yes    Normocytic normochromic anemia 8/27/2021 Yes    Elevated C-reactive protein (CRP) 8/27/2021 Yes    Polysubstance dependence including opioid drug with daily use ([facility] [address] 8/27/2021 Yes          Plan:     Patient status inpatient in the Med/Surge    1. Left upper extremity cellulitis: Continue Vank/Zosyn. Awaiting blood culture speciation. Wound culture  2. Elevated CRP: Due to left upper extremity cellulitis  3. Neutrophilic leukocytosis: With underlying cellulitis  4. Normocytic normochromic anemia: Check iron studies. Monitor hemoglobin. Could be secondary to active infection  5. Polysubstance abuse: Check UDS  6. GI/DVT prophylaxis: GI prophylaxis not warranted, continue Lovenox    Consultations:   IP CONSULT TO HOSPITALIST  IP CONSULT TO PHARMACY    Patient is admitted as inpatient status because of co-morbidities listed above, severity of signs and symptoms as outlined, requirement for current medical therapies and most importantly because of direct risk to patient if care not provided in a hospital setting.   Expected length of stay > 48 hours. SAMEER Reese NP  8/27/2021  9:23 AM    Copy sent to Dr. Trever Cooney primary care provider on file.

## 2021-08-27 NOTE — DISCHARGE SUMMARY
[facility]  Office: [address], DO, Yoandylisa Abhilash, DO, Oseas Braxton, DO, Theresa Gayle, DO, Nicholas Crook MD, Pat Colon MD, Sebastian Gonzales MD, Sotero Pond MD, Beryle Marseille, MD, Ciro Pallas, MD, Rock Tolliver MD, [facility], DO, Charmaine dEdy MD, Nohemi Noonan DO, Dhiraj Rivera MD,  Gilles Mariscal DO, Karime Ordoñez MD, Sheila Matute MD, Karley Whitfield MD, Destiny Mitchell MD, Hamilton Garner MD, Radha Hylton MD, Faith Encinas MD, Gisel Tipton, [facility], [facility], [facility], Pb Owen, CNP, Stephen Morocho, CNS, Gavin Carrasco, CNP, Katina Good, CNP, Gregoria Vasquez, CNP, Tio Jarquin, CNP, William Parr, CNP, Marcelina Coy PA-C, Mortimer Guardian, [facility], Simone Hawkins, CNP, Lavonne Frankel, CNP, Annie Hernandez, CNP, Anil Rey, CNP, Vincenzo Coburn, CNP, Henri Quintana, CNP, Aida Alonso, CNP, Jeovanny Erazo, [facility]    Discharge Summary     Patient ID: Verena Webb  :  1990   MRN: [id number]     ACCOUNT:  [de-identified]   Patient's PCP: No primary care provider on file. Admit Date: 2021   Discharge Date: 2021     Length of Stay: 1  Code Status:  Full Code  Admitting Physician: No admitting provider for patient encounter. Discharge Physician: Mortimer Guardian, SAMEER - ROQUE     Active Discharge Diagnoses:     Hospital Problem Lists:  Principal Problem:    Cellulitis of left arm  Active Problems:    Normocytic normochromic anemia    Elevated C-reactive protein (CRP)    Polysubstance dependence including opioid drug with daily use ([facility] [address]  Resolved Problems:    * No resolved hospital problems. *      Admission Condition:  poor     Discharged Condition: poor    Hospital Stay:     Hospital Course:  Verena Webb is a 32 y.o. male who was admitted for the management of   Cellulitis of left arm , presented to ER with Arm Pain and Arm Swelling    Verena Webb is a 32 y.o.  Declined male who presents with Arm Pain and Arm Swelling   and is admitted to the hospital for the management of Cellulitis of left arm.     Patient is a 41-year-old male with no real significant past medical history who presented to the emergency department for worsening left wrist pain, redness and swelling. Per patient he had recently used IV drugs to the area. He then states that his dog's leash got caught around his wrist resulting in the erythema and edema with warmth which was concerning for him. He presented to the emergency  department for evaluation where left wrist and hand x-rays were completed soft tissue swelling was identified without osseous abnormalities. Metabolic and hematologic abnormalities included normocytic normochromic anemia 15.7, neutrophilic leukocytosis and an elevated CRP and sed rate. [address] and started on empiric antibiotics, blood cultures x2 obtained and pending.     On assessment patient is awake and alert oriented x3 stating that he wants to leave the hospital because he wants to use again. Refusing lab work or further diagnostic interventions. Patient was strongly encouraged to remain in the hospital to continue antibiotic treatment and further work-up. States that he does not want to stay in the hospital.  Nurse was in room at time of discussion. AMA paperwork given to patient      Significant therapeutic interventions:     1. Left upper extremity cellulitis: Continue Vank/Zosyn. Awaiting blood culture speciation. Wound culture  2. Elevated CRP: Due to left upper extremity cellulitis  3. Neutrophilic leukocytosis: With underlying cellulitis  4. Normocytic normochromic anemia: Check iron studies. Monitor hemoglobin. Could be secondary to active infection  5. Polysubstance abuse: Check UDS  6.  GI/DVT prophylaxis: GI prophylaxis not warranted, continue Lovenox    Significant Diagnostic Studies:   Labs / Micro:  CBC:   Lab Results   Component Value Date WBC 12.6 08/26/2021    RBC 4.23 08/26/2021    HGB 10.8 08/26/2021    HCT 35.6 08/26/2021    MCV 84.2 08/26/2021    MCH 25.5 08/26/2021    MCHC 30.3 08/26/2021    RDW 13.6 08/26/2021     08/26/2021     BMP:    Lab Results   Component Value Date    GLUCOSE 91 08/26/2021     08/26/2021    K 3.6 08/26/2021    CL 98 08/26/2021    CO2 26 08/26/2021    ANIONGAP 12 08/26/2021    BUN 13 08/26/2021    CREATININE 1.14 08/26/2021    BUNCRER NOT REPORTED 08/26/2021    CALCIUM 8.9 08/26/2021    LABGLOM >60 08/26/2021    GFRAA >60 08/26/2021    GFR      08/26/2021    GFR NOT REPORTED 08/26/2021     HFP:  No results found for: ALB, PROT  CMP:    Lab Results   Component Value Date    GLUCOSE 91 08/26/2021     08/26/2021    K 3.6 08/26/2021    CL 98 08/26/2021    CO2 26 08/26/2021    BUN 13 08/26/2021    CREATININE 1.14 08/26/2021    ANIONGAP 12 08/26/2021    LABGLOM >60 08/26/2021    GFRAA >60 08/26/2021    GFR      08/26/2021    GFR NOT REPORTED 08/26/2021    CALCIUM 8.9 08/26/2021     PT/INR:  No results found for: PTINR, PROTIME, INR  PTT: No results found for: APTT  FLP:  No results found for: CHOL, TRIG, HDL  U/A:  No results found for: COLORU, TURBIDITY, SPECGRAV, HGBUR, PHUR, PROTEINU, GLUCOSEU, KETUA, BILIRUBINUR, UROBILINOGEN, NITRU, LEUKOCYTESUR  TSH:  No results found for: TSH     Radiology:  XR WRIST LEFT (MIN 3 VIEWS)    Result Date: 8/27/2021  Marked soft tissue swelling. No osseous abnormality evident. XR HAND LEFT (MIN 3 VIEWS)    Result Date: 8/27/2021  Soft tissue edema without acute osseous abnormality. Consultations:    Consults:     Final Specialist Recommendations/Findings:   IP CONSULT TO HOSPITALIST  IP CONSULT TO PHARMACY      The patient was seen and examined on day of discharge and this discharge summary is in conjunction with any daily progress note from day of discharge. Discharge plan:     Disposition: AMA    Physician Follow Up:     No follow-up provider specified.

## 2021-08-27 NOTE — ED PROVIDER NOTES
[facility]//MED SURG  Emergency Department Encounter  EmergencyMedicine Resident     Pt Katerine Lopez  MRN: [id number]  [place] 1990  Date of evaluation: 8/26/21  PCP:  No primary care provider on file. This patient was evaluated in the Emergency Department for symptoms described in the history of present illness. The patient was evaluated in the context of the global COVID-19 pandemic, which necessitated consideration that the patient might be at risk for infection with the SARS-CoV-2 virus that causes COVID-19. Institutional protocols and algorithms that pertain to the evaluation of patients at risk for COVID-19 are in a state of rapid change based on information released by regulatory bodies including the CDC and federal and state organizations. These policies and algorithms were followed during the patient's care in the ED. CHIEF COMPLAINT       Chief Complaint   Patient presents with    Arm Pain    Arm Swelling       HISTORY OF PRESENT ILLNESS  (Location/Symptom, Timing/Onset, Context/Setting, Quality, Duration, Modifying Factors, Severity.)      Bernardo Mullins is a 32 y.o. male who presents with left arm pain. Patient reports that he was using IV heroin in his left arm 3 days ago, developed progressive worsening erythema, edema, warmth of the left arm, left wrist pain, left hand pain. Patient denies fevers, chills, cough, congestion, chest pain, shortness of breath, abdominal pain, nausea, vomiting, diarrhea. Patient has history of IV drug use, denies any other past medical history. Patient denies past surgical history. PAST MEDICAL / SURGICAL / SOCIAL / FAMILY HISTORY      has no past medical history on file. IV drug use     has no past surgical history on file.   Denies past surgical history    Social History     Socioeconomic History    Marital status: Single     Spouse name: Not on file    Number of children: Not on file    Years of education: Not on file    Highest for chills, diaphoresis, fatigue and fever. HENT: Negative for congestion, rhinorrhea and sore throat. Eyes: Negative for visual disturbance. Respiratory: Negative for shortness of breath and wheezing. Cardiovascular: Negative for chest pain, palpitations and leg swelling. Gastrointestinal: Negative for abdominal pain, blood in stool, constipation, diarrhea, nausea and vomiting. Genitourinary: Negative for dysuria and hematuria. Musculoskeletal: Positive for arthralgias. Negative for neck pain and neck stiffness. Skin: Positive for color change and wound. Neurological: Negative for dizziness, syncope, weakness, light-headedness and headaches. Psychiatric/Behavioral: Negative for confusion. PHYSICAL EXAM   (up to 7 for level 4, 8 or more for level 5)      INITIAL VITALS:   BP (!) 111/58   Pulse 82   Temp 98.8 °F (37.1 °C) (Oral)   Resp 17   Ht 6' (1.829 m)   Wt 165 lb (74.8 kg)   SpO2 97%   BMI 22.38 kg/m²     Physical Exam  Constitutional:       General: He is not in acute distress. Appearance: He is well-developed. He is ill-appearing. He is not toxic-appearing or diaphoretic. Comments: Patient is alert and oriented, appears mildly ill, mild amount of pain, no acute distress, nontoxic   HENT:      Head: Normocephalic and atraumatic. Right Ear: External ear normal.      Left Ear: External ear normal.   Eyes:      General:         Right eye: No discharge. Left eye: No discharge. Extraocular Movements: Extraocular movements intact. Neck:      Vascular: No JVD. Trachea: No tracheal deviation. Cardiovascular:      Rate and Rhythm: Normal rate and regular rhythm. Pulses: Normal pulses. Heart sounds: Normal heart sounds. No murmur heard. No friction rub. No gallop. Pulmonary:      Effort: Pulmonary effort is normal. No respiratory distress. Breath sounds: Normal breath sounds. No stridor. No wheezing, rhonchi or rales.    Chest: Chest wall: No tenderness. Abdominal:      General: There is no distension. Palpations: Abdomen is soft. There is no mass. Tenderness: There is no abdominal tenderness. There is no right CVA tenderness, left CVA tenderness or guarding. Musculoskeletal:         General: Normal range of motion. Cervical back: Normal range of motion and neck supple. No rigidity or tenderness. Skin:     General: Skin is warm. Capillary Refill: Capillary refill takes less than 2 seconds. Findings: Lesion present. Comments: Patient has significant swelling, redness, warmth of the anterior aspect of the left wrist, wrist tenderness, hand tenderness, full range of motion, distal pulses intact and equal bilaterally, capillary refill less than 2 seconds, sensation light touch intact. Neurological:      Mental Status: He is alert and oriented to person, place, and time. Cranial Nerves: No cranial nerve deficit. Sensory: No sensory deficit. Motor: No weakness. Psychiatric:         Mood and Affect: Mood normal.         Behavior: Behavior normal.         DIFFERENTIAL  DIAGNOSIS     PLAN (LABS / IMAGING / EKG):  Orders Placed This Encounter   Procedures    Wound Gram stain    Wound Culture    XR WRIST LEFT (MIN 3 VIEWS)    XR HAND LEFT (MIN 3 VIEWS)    CBC Auto Differential    Basic Metabolic Panel w/ Reflex to MG    Sedimentation Rate    C-Reactive Protein    Basic Metabolic Panel w/ Reflex to MG    CBC    ADULT DIET;  Regular    Vital signs per unit routine    Notify physician    Daily weights    Intake and output    Elevate affected limb    Place intermittent pneumatic compression device    Up as tolerated    Full Code    Inpatient consult to [address] [place] [address] St to Dose: Vancomycin; Skin and Soft Tissue Infection Per Dr Rachael Weber to [facility] [address] (FROM ED OR OR/PROCEDURAL) Inpatient MEDICATIONS ORDERED:  Orders Placed This Encounter   Medications    cefTRIAXone (ROCEPHIN) 1000 mg IVPB in 50 mL D5W minibag     Order Specific Question:   Antimicrobial Indications     Answer:   Skin and Soft Tissue Infection    vancomycin (VANCOCIN) 1000 mg in dextrose 5% 200 mL IVPB     Order Specific Question:   Antimicrobial Indications     Answer:   Skin and Soft Tissue Infection    0.9 % sodium chloride infusion    sodium chloride flush 0.9 % injection 5-40 mL    sodium chloride flush 0.9 % injection 10 mL    0.9 % sodium chloride infusion    OR Linked Order Group     potassium chloride (KLOR-CON M) extended release tablet 40 mEq     potassium bicarb-citric acid (EFFER-K) effervescent tablet 40 mEq     potassium chloride 10 mEq/100 mL IVPB (Peripheral Line)    magnesium sulfate 1000 mg in dextrose 5% 100 mL IVPB    enoxaparin (LOVENOX) injection 40 mg    OR Linked Order Group     ondansetron (ZOFRAN-ODT) disintegrating tablet 4 mg     ondansetron (ZOFRAN) injection 4 mg    polyethylene glycol (GLYCOLAX) packet 17 g    OR Linked Order Group     acetaminophen (TYLENOL) tablet 650 mg     acetaminophen (TYLENOL) suppository 650 mg    piperacillin-tazobactam (ZOSYN) 3,375 mg in dextrose 5 % 50 mL IVPB extended infusion (mini-bag)     Order Specific Question:   Antimicrobial Indications     Answer:   Skin and Soft Tissue Infection    vancomycin (VANCOCIN) 1000 mg in dextrose 5% 200 mL IVPB     Order Specific Question:   Antimicrobial Indications     Answer:   Skin and Soft Tissue Infection    vancomycin (VANCOCIN) intermittent dosing (placeholder)     Order Specific Question:   Antimicrobial Indications     Answer:   Skin and Soft Tissue Infection       DDX:     DIAGNOSTIC RESULTS / EMERGENCY DEPARTMENT COURSE / MDM   LAB RESULTS:  Results for orders placed or performed during the hospital encounter of 08/26/21   CBC Auto Differential   Result Value Ref Range    WBC 12.6 (H) 3.5 - 11.3 k/uL    RBC 4.23 4.21 - 5.77 m/uL    Hemoglobin 10.8 (L) 13.0 - 17.0 g/dL    Hematocrit 35.6 (L) 40.7 - 50.3 %    MCV 84.2 82.6 - 102.9 fL    MCH 25.5 25.2 - 33.5 pg    MCHC 30.3 28.4 - 34.8 g/dL    RDW 13.6 11.8 - 14.4 %    Platelets 999 545 - 006 k/uL    MPV 9.4 8.1 - 13.5 fL    NRBC Automated 0.0 0.0 per 100 WBC    Differential Type NOT REPORTED     Seg Neutrophils 72 (H) 36 - 65 %    Lymphocytes 19 (L) 24 - 43 %    Monocytes 7 3 - 12 %    Eosinophils % 2 1 - 4 %    Basophils 0 0 - 2 %    Immature Granulocytes 0 0 %    Segs Absolute 9.00 (H) 1.50 - 8.10 k/uL    Absolute Lymph # 2.39 1.10 - 3.70 k/uL    Absolute Mono # 0.86 0.10 - 1.20 k/uL    Absolute Eos # 0.25 0.00 - 0.44 k/uL    Basophils Absolute <0.03 0.00 - 0.20 k/uL    Absolute Immature Granulocyte 0.04 0.00 - 0.30 k/uL    WBC Morphology NOT REPORTED     RBC Morphology NOT REPORTED     Platelet Estimate NOT REPORTED    Basic Metabolic Panel w/ Reflex to MG   Result Value Ref Range    Glucose 91 70 - 99 mg/dL    BUN 13 6 - 20 mg/dL    CREATININE 1.14 0.70 - 1.20 mg/dL    Bun/Cre Ratio NOT REPORTED 9 - 20    Calcium 8.9 8.6 - 10.4 mg/dL    Sodium 136 135 - 144 mmol/L    Potassium 3.6 (L) 3.7 - 5.3 mmol/L    Chloride 98 98 - 107 mmol/L    CO2 26 20 - 31 mmol/L    Anion Gap 12 9 - 17 mmol/L    GFR Non-African American >60 >60 mL/min    GFR African American >60 >60 mL/min    GFR Comment          GFR Staging NOT REPORTED    Sedimentation Rate   Result Value Ref Range    Sed Rate 70 (H) 0 - 15 mm   C-Reactive Protein   Result Value Ref Range    CRP 70.2 (H) 0.0 - 5.0 mg/L       IMPRESSION: 58-year-old male with history of IV drug use presenting with infection of the left wrist, bedside ultrasound concerning for significant cellulitis, no definitive abscess, will start patient on antibiotics, obtain infectious labs, admit for further management.     RADIOLOGY:  XR WRIST LEFT (MIN 3 VIEWS)    Result Date: 8/27/2021  EXAMINATION: 4 XRAY VIEWS OF THE LEFT WRIST Dolores Florentino MD  Emergency Medicine Resident    (Please note that portions of thisnote were completed with a voice recognition program.  Efforts were made to edit the dictations but occasionally words are mis-transcribed.)        Payam Fair MD  Resident  08/27/21 [id number]

## 2021-08-27 NOTE — ED NOTES
3B RN called back for report. Report called, all questions answered at this time.       Belinda Aceves RN  08/27/21 9618

## 2021-08-27 NOTE — ED NOTES
Bed: 08  Expected date:   Expected time:   Means of arrival:   Comments:  [address], RN  08/27/21 0128

## 2021-08-27 NOTE — ED NOTES
Report rec'd from [place] RN  All questions answered at this time.    Pt is waiting for the following:  [] Lab/rad results    [x] Room assignment     [x] Other: vanco to finish infusing      Myles Mcneil RN  08/27/21 4785

## 2022-03-02 ENCOUNTER — HOSPITAL ENCOUNTER (EMERGENCY)
Age: 32
Discharge: HOME | End: 2022-03-02
Attending: EMERGENCY MEDICINE
Payer: MEDICARE

## 2022-03-02 VITALS
TEMPERATURE: 98.7 F | RESPIRATION RATE: 18 BRPM | DIASTOLIC BLOOD PRESSURE: 79 MMHG | SYSTOLIC BLOOD PRESSURE: 122 MMHG | OXYGEN SATURATION: 100 % | HEART RATE: 82 BPM

## 2022-03-02 DIAGNOSIS — U07.1 2019 NOVEL CORONAVIRUS DISEASE (COVID-19): Primary | ICD-10-CM

## 2022-03-02 DIAGNOSIS — Z76.89 RETURN TO WORK EVALUATION: ICD-10-CM

## 2022-03-02 PROCEDURE — 99284 EMERGENCY DEPT VISIT MOD MDM: CPT

## 2022-03-02 ASSESSMENT — ENCOUNTER SYMPTOMS: COUGH: 1

## 2022-03-02 NOTE — Clinical Note
Lisa Roque was seen and treated in our emergency department on 3/2/2022. The patient is unable to return to work until he is asymptomatic from his Covid symptoms or cleared by a physician.     Mathieu Almanza MD

## 2022-03-02 NOTE — ED PROVIDER NOTES
[facility]  EMERGENCY DEPARTMENT ENCOUNTER      PtName: Annia Lee  MRN: [id number]  [place] 1990  Date of evaluation: 3/2/22      CHIEF COMPLAINT       Chief Complaint   Patient presents with    Letter for School/Work     Needs a mdical clearance to return to work after having covid ten days ago         HISTORY OF PRESENT ILLNESS (4 or more for level 4 or 5)    Annia Lee is a 32 y.o. male who presents requesting clearance to return to work. The patient did test positive for Covid 8 days ago. At that time he is having fever sweats. Currently he still complains of having sweats but his symptoms have improved a bit. This patient was evaluated in the Emergency Department for symptoms described in the history of present illness. This patient was evaluated in the context of the global COVID-19 pandemic, which necessitated consideration that the patient might be at risk for infection with the SARS-CoV-2 virus that causes COVID-19. Institutional protocols and algorithms that pertain to the evaluation of patients at risk for COVID-19 are in a state of rapid change based on information released by regulatory bodies including the CDC and federal and state organizations. These policies and algorithms were followed during the patient's care in the ED. REVIEW OF SYSTEMS  (2-9 for Level 4, 10 or more Level 5)     Review of Systems   Constitutional: Positive for diaphoresis. Negative for chills. Respiratory: Positive for cough. Cardiovascular: Negative for chest pain. PAST MEDICAL HISTORY PAST SURGICAL HISTORY (1 for Level 4, 2 for Level 5)    has a past medical history of Polysubstance abuse ([facility] [address] has no past surgical history on file.       CURRENT MEDICATIONS       Current Discharge Medication List      CONTINUE these medications which have NOT CHANGED    Details   acetaminophen (APAP EXTRA STRENGTH) 500 MG tablet Take 2 tablets by mouth every 6 hours as needed for Pain  Qty: 30 tablet, Refills: 0      ibuprofen (ADVIL;MOTRIN) 600 MG tablet Take 1 tablet by mouth every 6 hours as needed for Pain  Qty: 30 tablet, Refills: 0             ALLERGIES     is allergic to benzocaine. FAMILY HISTORY     has no family status information on file. family history is not on file. SOCIAL HISTORY      reports that he has been smoking. He has been smoking about 1.00 pack per day. He has never used smokeless tobacco. He reports current alcohol use. He reports current drug use. Drugs: Marijuana Fredick Copping), Cocaine, and IV. PHYSICAL EXAM  (5-7 for level 4, 8 or more level 5)   INITIAL VITALS:  oral temperature is 98.7 °F (37.1 °C). His blood pressure is 122/79 and his pulse is 82. His respiration is 18 and oxygen saturation is 100%. Physical Exam  Vitals reviewed. Eyes:      General:         Right eye: No discharge. Left eye: No discharge. Cardiovascular:      Rate and Rhythm: Normal rate and regular rhythm. Pulmonary:      Effort: Pulmonary effort is normal.      Breath sounds: Normal breath sounds. Skin:     General: Skin is warm and dry. Neurological:      Mental Status: He is alert. DIFFERENTIAL DIAGNOSIS/ MDM:         DIAGNOSTIC RESULTS       EMERGENCY DEPARTMENTCOURSE:   Vitals:    Vitals:    03/02/22 0913   BP: 122/79   Pulse: 82   Resp: 18   Temp: 98.7 °F (37.1 °C)   TempSrc: Oral   SpO2: 100%     -------------------------  BP: 122/79, Temp: 98.7 °F (37.1 °C), Pulse: 82, Resp: 18    As the patient is still symptomatic from his Covid at this time he has not been cleared to return to work. Patient will be given a clinic sheet for follow-up to get clearance to return to work. Patient understands to continue wearing a mask. FINALIMPRESSION      1. 2019 novel coronavirus disease (COVID-19)    2.  Return to work evaluation          DISPOSITION/PLAN   DISPOSITION Decision To Discharge 03/02/2022 09:43:19 AM      PATIENT REFERRED TO:  Primary care physician of choice    Call today  For follow-up appointment when symptoms have resolved      DISCHARGE MEDICATIONS:  Current Discharge Medication List          (Please note that portions of this note were completed with a voice recognitionprogram.  Efforts were made to edit the dictations but occasionally words are mis-transcribed.)    Suhail Erickson MD F.A.C.E.P.   Attending Emergency Physician                   Suhail Erickson MD  03/02/22 1000

## 2022-03-07 ENCOUNTER — TELEMEDICINE (OUTPATIENT)
Dept: PRIMARY CARE CLINIC | Age: 32
End: 2022-03-07
Payer: COMMERCIAL

## 2022-03-07 DIAGNOSIS — Z76.89 ENCOUNTER TO ESTABLISH CARE: Primary | ICD-10-CM

## 2022-03-07 DIAGNOSIS — F19.20 POLYSUBSTANCE DEPENDENCE INCLUDING OPIOID DRUG WITH DAILY USE (HCC): ICD-10-CM

## 2022-03-07 DIAGNOSIS — Z86.16 PERSONAL HISTORY OF COVID-19: ICD-10-CM

## 2022-03-07 DIAGNOSIS — F11.20 POLYSUBSTANCE DEPENDENCE INCLUDING OPIOID DRUG WITH DAILY USE (HCC): ICD-10-CM

## 2022-03-07 PROBLEM — L03.114 CELLULITIS OF LEFT ARM: Status: RESOLVED | Noted: 2021-08-27 | Resolved: 2022-03-07

## 2022-03-07 PROBLEM — D64.9 NORMOCYTIC NORMOCHROMIC ANEMIA: Status: RESOLVED | Noted: 2021-08-27 | Resolved: 2022-03-07

## 2022-03-07 PROBLEM — R79.82 ELEVATED C-REACTIVE PROTEIN (CRP): Status: RESOLVED | Noted: 2021-08-27 | Resolved: 2022-03-07

## 2022-03-07 PROCEDURE — 99203 OFFICE O/P NEW LOW 30 MIN: CPT | Performed by: NURSE PRACTITIONER

## 2022-03-07 PROCEDURE — G8427 DOCREV CUR MEDS BY ELIG CLIN: HCPCS | Performed by: NURSE PRACTITIONER

## 2022-03-07 SDOH — ECONOMIC STABILITY: FOOD INSECURITY: WITHIN THE PAST 12 MONTHS, YOU WORRIED THAT YOUR FOOD WOULD RUN OUT BEFORE YOU GOT MONEY TO BUY MORE.: NEVER TRUE

## 2022-03-07 SDOH — ECONOMIC STABILITY: FOOD INSECURITY: WITHIN THE PAST 12 MONTHS, THE FOOD YOU BOUGHT JUST DIDN'T LAST AND YOU DIDN'T HAVE MONEY TO GET MORE.: NEVER TRUE

## 2022-03-07 ASSESSMENT — SOCIAL DETERMINANTS OF HEALTH (SDOH): HOW HARD IS IT FOR YOU TO PAY FOR THE VERY BASICS LIKE FOOD, HOUSING, MEDICAL CARE, AND HEATING?: NOT HARD AT ALL

## 2022-03-07 ASSESSMENT — PATIENT HEALTH QUESTIONNAIRE - PHQ9
SUM OF ALL RESPONSES TO PHQ QUESTIONS 1-9: 0
SUM OF ALL RESPONSES TO PHQ9 QUESTIONS 1 & 2: 0
2. FEELING DOWN, DEPRESSED OR HOPELESS: 0
SUM OF ALL RESPONSES TO PHQ QUESTIONS 1-9: 0
1. LITTLE INTEREST OR PLEASURE IN DOING THINGS: 0

## 2022-03-07 ASSESSMENT — ENCOUNTER SYMPTOMS
NAUSEA: 0
DIARRHEA: 0
ABDOMINAL PAIN: 0
SHORTNESS OF BREATH: 0
CHEST TIGHTNESS: 0
COLOR CHANGE: 0
RHINORRHEA: 0
VOMITING: 0
SORE THROAT: 0

## 2022-03-07 NOTE — PROGRESS NOTES
3/7/2022    TELEHEALTH EVALUATION -- Audio/Visual (During PVUHJ-35 public health emergency)    HPI:    Melony Bucio (:  1990) has requested an audio/video evaluation for the following concern(s):    Here to establish care  PMH significant for polysubstance abuse, denies any current illicit drug use  Has not followed with PCP in many years    Had recent covid-19 infection, tested positive 22, feels improved with supportive care at home. First missed day of work 22. Tested through work- [employer]  Had body aches, weakness, headache which are all resolved. Needs cleared to return to work, 3/8/22. Will send to pt via [employer]. No other concerns or complaints     Review of Systems   Constitutional: Negative for activity change, fatigue and fever. HENT: Negative for congestion, rhinorrhea and sore throat. Eyes: Negative for visual disturbance. Respiratory: Negative for chest tightness and shortness of breath. Cardiovascular: Negative for chest pain and palpitations. Gastrointestinal: Negative for abdominal pain, diarrhea, nausea and vomiting. Endocrine: Negative for polydipsia. Genitourinary: Negative for difficulty urinating. Musculoskeletal: Negative for arthralgias and myalgias. Skin: Negative for color change. Neurological: Negative for weakness and headaches. Psychiatric/Behavioral: Negative for behavioral problems. The patient is not nervous/anxious. All other systems reviewed and are negative. Prior to Visit Medications    Medication Sig Taking?  Authorizing Provider   acetaminophen (APAP EXTRA STRENGTH) 500 MG tablet Take 2 tablets by mouth every 6 hours as needed for Pain  Ra Philip, DO       Social History     Tobacco Use    Smoking status: Current Every Day Smoker     Packs/day: 0.50    Smokeless tobacco: Never Used   Vaping Use    Vaping Use: Never used   Substance Use Topics    Alcohol use: Not Currently    Drug use: Not Currently     Types: Marijuana ([place]), Cocaine, IV     Comment: heroin, last use today 8/24            PHYSICAL EXAMINATION:  [ INSTRUCTIONS:  \"[x]\" Indicates a positive item  \"[]\" Indicates a negative item  -- DELETE ALL ITEMS NOT EXAMINED]  Vital Signs: (As obtained by patient/caregiver or practitioner observation)    Blood pressure-  Heart rate-    Respiratory rate-    Temperature-  Pulse oximetry-     Constitutional: [x] Appears well-developed and well-nourished [x] No apparent distress      [] Abnormal-   Mental status  [x] Alert and awake  [x] Oriented to person/place/time [x]Able to follow commands      Eyes:  EOM    []  Normal  [] Abnormal-  Sclera  []  Normal  [] Abnormal -         Discharge []  None visible  [] Abnormal -    HENT:   [] Normocephalic, atraumatic. [] Abnormal   [] Mouth/Throat: Mucous membranes are moist.     External Ears [x] Normal  [] Abnormal- Missing teeth   Neck: [x] No visualized mass     Pulmonary/Chest: [x] Respiratory effort normal.  [x] No visualized signs of difficulty breathing or respiratory distress        [] Abnormal-      Musculoskeletal:   [x] Normal gait with no signs of ataxia         [] Normal range of motion of neck        [] Abnormal-       Neurological:        [x] No Facial Asymmetry (Cranial nerve 7 motor function) (limited exam to video visit)          [] No gaze palsy        [] Abnormal-         Skin:        [x] No significant exanthematous lesions or discoloration noted on facial skin         [] Abnormal-            Psychiatric:       [x] Normal Affect [] No Hallucinations        [] Abnormal-     Other pertinent observable physical exam findings-     ASSESSMENT/PLAN:  1. Encounter to establish care  We will plan to see patient back in office for physical, vital signs and screening labs    2. Personal history of COVID-19  Symptoms resolved, recovered well at home with supportive care. Okay to return to work 3/8/2022, will send letter through my chart    3.  Polysubstance dependence including opioid drug with daily use [facility])  Patient denies current illicit drug use     Return in about 1 month (around 4/7/2022) for physical .    Cecil Pace, was evaluated through a synchronous (real-time) audio-video encounter. The patient (or guardian if applicable) is aware that this is a billable service, which includes applicable co-pays. This Virtual Visit was conducted with patient's (and/or legal guardian's) consent. The visit was conducted pursuant to the emergency declaration under the [address] [place] [address], [address] [place] [address] authority and the [employer] General Act. Patient identification was verified, and a caregiver was present when appropriate. The patient was located at home in a state where the provider was licensed to provide care. Total time spent on this encounter: Not billed by time    --SAMEER Bowers CNP on 3/7/2022 at 10:34 AM    An electronic signature was used to authenticate this note.

## 2022-03-07 NOTE — LETTER
[facility]  [address]  [facility] [address]  Phone: [phone]  Fax: Ganga Rodgers [address], APRN - CNP        March 7, 2022     Patient: Melony Bucio   YOB: 1990   Date of Visit: 3/7/2022       To Whom It May Concern: It is my medical opinion that Melony Bucio may return to work on 3/8/22. If you have any questions or concerns, please don't hesitate to call.     Sincerely,        Fatimah Couch, APRN - CNP

## 2022-03-07 NOTE — PATIENT INSTRUCTIONS
Patient Education        COVID-19 Antibody Test: About This Test  What is it? An antibody test looks for antibodies in the blood. These are proteins that your immune system makes, usually after you're exposed to germs like viruses or bacteria or after you get a vaccine. Antibodies work to fight illness. A COVID-19 antibody test looks for antibodies to SARS-CoV-2, the virus that causes COVID-19. If you test positive for these antibodies, it could mean that you already had COVID-19 or that you've been vaccinated for COVID-19. Why is it done? This test can be used to diagnose a past infection with the virus that causes COVID-19. Many people who get COVID-19 never have symptoms or have only mild ones. Without antibody testing, these people might never know that they already had the virus. Even if the test shows that you may have had COVID-19, you need to keep taking steps to protect yourself and others from the virus. Having COVID-19 in the past may not prevent you from getting it again. Antibody testing is important because:  · It could show who has already had COVID-19. · It could show who hasn't had the infection. · It helps experts who are tracking COVID-19 learn more about the virus and how it spreads. Talk to your doctor about what the test results mean for you. How do you prepare for the test?  You don't need to do anything to prepare for this test. But be sure to follow any instructions your health care provider gives you. How is it done? This is a blood test. A health professional may prick your finger or use a needle to take a sample of blood from your arm. What do your results mean? The result is either positive or negative. A positive result means antibodies to SARS-CoV-2 were found. You probably already had COVID-19 or had a COVID-19 vaccine. But:  · You could get a \"false-positive\" result. The test might show that you have COVID-19 antibodies when you don't.  The test may find antibodies that formed in response to another type of coronavirus. · It's not certain that having these antibodies will protect you from getting COVID-19 again. And if it does, it's not clear how long the protection lasts. A negative result means that these antibodies were not found. · You could get a \"false-negative\" result. It takes a while after you're infected or vaccinated for your immune system to make antibodies. · You could have a negative result but be infected now. You'd need a different test (viral test) to know if you have COVID-19 now. Where can you learn more? Go to [email or web address] org and sign in to your [employer] account. Enter A128 in the [employer] box to learn more about \"COVID-19 Antibody Test: About This Test.\"     If you do not have an account, please click on the \"Sign Up Now\" link. Current as of: March 26, 2021               Content Version: 13.1  © 2006-2021 Healthwise, Incorporated. Care instructions adapted under license by [facility] ([facility]). If you have questions about a medical condition or this instruction, always ask your healthcare professional. [address] any warranty or liability for your use of this information.

## 2022-03-08 ENCOUNTER — TELEPHONE (OUTPATIENT)
Dept: PRIMARY CARE CLINIC | Age: 32
End: 2022-03-08

## 2022-03-08 NOTE — TELEPHONE ENCOUNTER
Patient called in and states his work letter needs updated to say he can return to work on 3/9 and not 3/8.

## 2022-03-08 NOTE — TELEPHONE ENCOUNTER
----- Message from [place] [facility] [place] sent at 3/8/2022  3:18 PM EST -----  Subject: Message to Provider    QUESTIONS  Information for Provider? Pt had vv yesterday 3/7 to establish care with   pcp Luis Angel Bird. States he is needing a work clearance sent to him via   email stating he can return to work 3/9, please advise. [email or web address]  ---------------------------------------------------------------------------  --------------  CALL BACK INFO  What is the best way for the office to contact you? OK to leave message on   voicemail  Preferred Call Back Phone Number? [phone]  ---------------------------------------------------------------------------  --------------  SCRIPT ANSWERS  Relationship to Patient?  Self

## 2022-03-11 ENCOUNTER — OFFICE VISIT (OUTPATIENT)
Dept: PRIMARY CARE CLINIC | Age: 32
End: 2022-03-11
Payer: MEDICARE

## 2022-03-11 VITALS
SYSTOLIC BLOOD PRESSURE: 141 MMHG | TEMPERATURE: 98.4 F | OXYGEN SATURATION: 98 % | DIASTOLIC BLOOD PRESSURE: 82 MMHG | HEART RATE: 60 BPM

## 2022-03-11 DIAGNOSIS — U07.1 COVID: Primary | ICD-10-CM

## 2022-03-11 PROCEDURE — G8427 DOCREV CUR MEDS BY ELIG CLIN: HCPCS | Performed by: INTERNAL MEDICINE

## 2022-03-11 PROCEDURE — G8484 FLU IMMUNIZE NO ADMIN: HCPCS | Performed by: INTERNAL MEDICINE

## 2022-03-11 PROCEDURE — 4004F PT TOBACCO SCREEN RCVD TLK: CPT | Performed by: INTERNAL MEDICINE

## 2022-03-11 PROCEDURE — 99213 OFFICE O/P EST LOW 20 MIN: CPT | Performed by: INTERNAL MEDICINE

## 2022-03-11 PROCEDURE — G8420 CALC BMI NORM PARAMETERS: HCPCS | Performed by: INTERNAL MEDICINE

## 2022-03-11 ASSESSMENT — ENCOUNTER SYMPTOMS: COUGH: 1

## 2022-03-11 NOTE — LETTER
[place] [address]  Phone: [phone]  Fax: [phone]    Guille Lezama MD        March 11, 2022     Patient: Benjamine Lesch   YOB: 1990   Date of Visit: 3/11/2022       To Whom It May Concern: It is my medical opinion that Benjamine Lesch may return to full duty immediately with no restrictions. If you have any questions or concerns, please don't hesitate to call.     Sincerely,        Guille Lezama MD

## 2022-03-11 NOTE — PROGRESS NOTES
[facility] [place] [address]  Dept: [phone]  Dept Fax: [phone]    Roberto Lal is a 32 y.o. male who presents to the urgent care today for his medicalconditions/complaints as noted below. Roberto Lal is c/o of Other (needs return to work  note had covid last week )      HPI:     URI   This is a new (positive COVID test on 2/21/22) problem. The current episode started 1 to 4 weeks ago. The problem has been resolved. There has been no fever. Associated symptoms include congestion and coughing. He has tried nothing for the symptoms. The treatment provided no relief. Needs note to RTW. Past Medical History:   Diagnosis Date    Polysubstance abuse ([address]         No current outpatient medications on file. No current facility-administered medications for this visit. Allergies   Allergen Reactions    Benzocaine        Health Maintenance   Topic Date Due    Hepatitis C screen  Never done    Varicella vaccine (1 of 2 - 2-dose childhood series) Never done    COVID-19 Vaccine (1) Never done    Pneumococcal 0-64 years Vaccine (1 of 2 - PPSV23) Never done    HIV screen  Never done    DTaP/Tdap/Td vaccine (1 - Tdap) Never done    Flu vaccine (1) Never done    Depression Screen  03/07/2023    Hepatitis A vaccine  Aged Out    Hepatitis B vaccine  Aged Out    Hib vaccine  Aged Out    Meningococcal (ACWY) vaccine  Aged Out       Subjective:      Review of Systems   HENT: Positive for congestion. Respiratory: Positive for cough. All other systems reviewed and are negative. Objective:     Physical Exam  Vitals reviewed. Constitutional:       Appearance: Normal appearance. HENT:      Head: Normocephalic and atraumatic. Skin:     General: Skin is warm and dry. Neurological:      General: No focal deficit present. Mental Status: He is alert and oriented to person, place, and time. Psychiatric:         Mood and Affect: Mood normal.         Behavior: Behavior normal.       BP (!) 141/82   Pulse 60   Temp 98.4 °F (36.9 °C) (Temporal)   SpO2 98%       Assessment:       Diagnosis Orders   1. COVID         Plan:      No follow-ups on file. No orders of the defined types were placed in this encounter. No orders of the defined types were placed in this encounter. Note to RTW. Patient given educational materials - see patient instructions. Discussed use, benefit, and side effects of prescribed medications. All patientquestions answered. Pt voiced understanding.     Electronically signed by Christy Lieberman MD on 3/11/2022at 11:12 AM

## 2022-03-15 ENCOUNTER — TELEPHONE (OUTPATIENT)
Dept: PRIMARY CARE CLINIC | Age: 32
End: 2022-03-15

## 2022-03-15 NOTE — TELEPHONE ENCOUNTER
[address] at [facility] wanted to certify the dates that patient was certified to be off work. Informed her that 3/11/22 was the only date certified per Dr. Ernestina Lynn letter.

## 2022-08-18 ENCOUNTER — HOSPITAL ENCOUNTER (INPATIENT)
Age: 32
LOS: 1 days | Discharge: LEFT BEFORE DISCHARGE | DRG: 540 | End: 2022-08-18
Attending: EMERGENCY MEDICINE | Admitting: STUDENT IN AN ORGANIZED HEALTH CARE EDUCATION/TRAINING PROGRAM
Payer: COMMERCIAL

## 2022-08-18 ENCOUNTER — APPOINTMENT (OUTPATIENT)
Dept: GENERAL RADIOLOGY | Age: 32
DRG: 540 | End: 2022-08-18
Payer: COMMERCIAL

## 2022-08-18 VITALS
DIASTOLIC BLOOD PRESSURE: 68 MMHG | RESPIRATION RATE: 11 BRPM | SYSTOLIC BLOOD PRESSURE: 106 MMHG | BODY MASS INDEX: 21.7 KG/M2 | OXYGEN SATURATION: 99 % | HEART RATE: 57 BPM | WEIGHT: 160 LBS | TEMPERATURE: 97.9 F

## 2022-08-18 DIAGNOSIS — L03.113 CELLULITIS OF RIGHT HAND: Primary | ICD-10-CM

## 2022-08-18 PROBLEM — F19.90 IVDU (INTRAVENOUS DRUG USER): Status: ACTIVE | Noted: 2022-08-18

## 2022-08-18 PROBLEM — R70.0 ELEVATED SED RATE: Status: ACTIVE | Noted: 2022-08-18

## 2022-08-18 PROBLEM — M86.9 OSTEOMYELITIS (HCC): Status: ACTIVE | Noted: 2022-08-18

## 2022-08-18 PROBLEM — D72.825 BANDEMIA: Status: ACTIVE | Noted: 2022-08-18

## 2022-08-18 LAB
ABSOLUTE EOS #: 0.36 K/UL (ref 0–0.44)
ABSOLUTE IMMATURE GRANULOCYTE: 0.04 K/UL (ref 0–0.3)
ABSOLUTE LYMPH #: 2.73 K/UL (ref 1.1–3.7)
ABSOLUTE MONO #: 0.67 K/UL (ref 0.1–1.2)
ALBUMIN SERPL-MCNC: 4.3 G/DL (ref 3.5–5.2)
ALBUMIN/GLOBULIN RATIO: 1 (ref 1–2.5)
ALP BLD-CCNC: 86 U/L (ref 40–129)
ALT SERPL-CCNC: 10 U/L (ref 5–41)
ANION GAP SERPL CALCULATED.3IONS-SCNC: 9 MMOL/L (ref 9–17)
AST SERPL-CCNC: 9 U/L
BASOPHILS # BLD: 0 % (ref 0–2)
BASOPHILS ABSOLUTE: 0.04 K/UL (ref 0–0.2)
BILIRUB SERPL-MCNC: 0.4 MG/DL (ref 0.3–1.2)
BUN BLDV-MCNC: 11 MG/DL (ref 6–20)
C-REACTIVE PROTEIN: 11.8 MG/L (ref 0–5)
CALCIUM SERPL-MCNC: 8.8 MG/DL (ref 8.6–10.4)
CHLORIDE BLD-SCNC: 97 MMOL/L (ref 98–107)
CO2: 27 MMOL/L (ref 20–31)
CREAT SERPL-MCNC: 0.9 MG/DL (ref 0.7–1.2)
EOSINOPHILS RELATIVE PERCENT: 3 % (ref 1–4)
GFR AFRICAN AMERICAN: >60 ML/MIN
GFR NON-AFRICAN AMERICAN: >60 ML/MIN
GFR SERPL CREATININE-BSD FRML MDRD: ABNORMAL ML/MIN/{1.73_M2}
GLUCOSE BLD-MCNC: 81 MG/DL (ref 70–99)
HCT VFR BLD CALC: 36.3 % (ref 40.7–50.3)
HEMOGLOBIN: 10.8 G/DL (ref 13–17)
IMMATURE GRANULOCYTES: 0 %
INR BLD: 1
LACTIC ACID, SEPSIS WHOLE BLOOD: 1.1 MMOL/L (ref 0.5–1.9)
LACTIC ACID, SEPSIS WHOLE BLOOD: 1.3 MMOL/L (ref 0.5–1.9)
LYMPHOCYTES # BLD: 20 % (ref 24–43)
MAGNESIUM: 2 MG/DL (ref 1.6–2.6)
MCH RBC QN AUTO: 26.3 PG (ref 25.2–33.5)
MCHC RBC AUTO-ENTMCNC: 29.8 G/DL (ref 28.4–34.8)
MCV RBC AUTO: 88.5 FL (ref 82.6–102.9)
MONOCYTES # BLD: 5 % (ref 3–12)
MRSA, DNA, NASAL: ABNORMAL
NRBC AUTOMATED: 0 PER 100 WBC
PDW BLD-RTO: 16.4 % (ref 11.8–14.4)
PLATELET # BLD: 271 K/UL (ref 138–453)
PMV BLD AUTO: 9.8 FL (ref 8.1–13.5)
POTASSIUM SERPL-SCNC: 3.9 MMOL/L (ref 3.7–5.3)
PROCALCITONIN: 0.17 NG/ML
PROTHROMBIN TIME: 11.1 SEC (ref 9.1–12.3)
RBC # BLD: 4.1 M/UL (ref 4.21–5.77)
RBC # BLD: ABNORMAL 10*6/UL
SEDIMENTATION RATE, ERYTHROCYTE: 58 MM/HR (ref 0–15)
SEG NEUTROPHILS: 72 % (ref 36–65)
SEGMENTED NEUTROPHILS ABSOLUTE COUNT: 9.57 K/UL (ref 1.5–8.1)
SODIUM BLD-SCNC: 133 MMOL/L (ref 135–144)
SPECIMEN DESCRIPTION: ABNORMAL
TOTAL CK: 29 U/L (ref 39–308)
TOTAL PROTEIN: 8.4 G/DL (ref 6.4–8.3)
WBC # BLD: 13.4 K/UL (ref 3.5–11.3)

## 2022-08-18 PROCEDURE — 85610 PROTHROMBIN TIME: CPT

## 2022-08-18 PROCEDURE — 2580000003 HC RX 258: Performed by: NURSE PRACTITIONER

## 2022-08-18 PROCEDURE — 83605 ASSAY OF LACTIC ACID: CPT

## 2022-08-18 PROCEDURE — 99254 IP/OBS CNSLTJ NEW/EST MOD 60: CPT | Performed by: INTERNAL MEDICINE

## 2022-08-18 PROCEDURE — 99222 1ST HOSP IP/OBS MODERATE 55: CPT | Performed by: INTERNAL MEDICINE

## 2022-08-18 PROCEDURE — 1200000000 HC SEMI PRIVATE

## 2022-08-18 PROCEDURE — 86140 C-REACTIVE PROTEIN: CPT

## 2022-08-18 PROCEDURE — 73130 X-RAY EXAM OF HAND: CPT

## 2022-08-18 PROCEDURE — 2580000003 HC RX 258: Performed by: STUDENT IN AN ORGANIZED HEALTH CARE EDUCATION/TRAINING PROGRAM

## 2022-08-18 PROCEDURE — 80053 COMPREHEN METABOLIC PANEL: CPT

## 2022-08-18 PROCEDURE — 6360000002 HC RX W HCPCS: Performed by: INTERNAL MEDICINE

## 2022-08-18 PROCEDURE — 84145 PROCALCITONIN (PCT): CPT

## 2022-08-18 PROCEDURE — 83735 ASSAY OF MAGNESIUM: CPT

## 2022-08-18 PROCEDURE — 99284 EMERGENCY DEPT VISIT MOD MDM: CPT

## 2022-08-18 PROCEDURE — 85025 COMPLETE CBC W/AUTO DIFF WBC: CPT

## 2022-08-18 PROCEDURE — 82550 ASSAY OF CK (CPK): CPT

## 2022-08-18 PROCEDURE — 87641 MR-STAPH DNA AMP PROBE: CPT

## 2022-08-18 PROCEDURE — 6360000002 HC RX W HCPCS: Performed by: STUDENT IN AN ORGANIZED HEALTH CARE EDUCATION/TRAINING PROGRAM

## 2022-08-18 PROCEDURE — 85652 RBC SED RATE AUTOMATED: CPT

## 2022-08-18 RX ORDER — ACETAMINOPHEN 650 MG/1
650 SUPPOSITORY RECTAL EVERY 6 HOURS PRN
Status: DISCONTINUED | OUTPATIENT
Start: 2022-08-18 | End: 2022-08-18 | Stop reason: HOSPADM

## 2022-08-18 RX ORDER — LANOLIN ALCOHOL/MO/W.PET/CERES
3 CREAM (GRAM) TOPICAL NIGHTLY
Status: DISCONTINUED | OUTPATIENT
Start: 2022-08-18 | End: 2022-08-18 | Stop reason: HOSPADM

## 2022-08-18 RX ORDER — DOXYCYCLINE HYCLATE 100 MG
100 TABLET ORAL 2 TIMES DAILY
Qty: 28 TABLET | Refills: 0 | Status: SHIPPED | OUTPATIENT
Start: 2022-08-18 | End: 2022-09-01

## 2022-08-18 RX ORDER — POTASSIUM CHLORIDE 20 MEQ/1
40 TABLET, EXTENDED RELEASE ORAL PRN
Status: DISCONTINUED | OUTPATIENT
Start: 2022-08-18 | End: 2022-08-18 | Stop reason: HOSPADM

## 2022-08-18 RX ORDER — METHOCARBAMOL 500 MG/1
750 TABLET, FILM COATED ORAL EVERY 6 HOURS PRN
Status: DISCONTINUED | OUTPATIENT
Start: 2022-08-18 | End: 2022-08-18 | Stop reason: HOSPADM

## 2022-08-18 RX ORDER — POTASSIUM CHLORIDE 7.45 MG/ML
10 INJECTION INTRAVENOUS PRN
Status: DISCONTINUED | OUTPATIENT
Start: 2022-08-18 | End: 2022-08-18 | Stop reason: HOSPADM

## 2022-08-18 RX ORDER — ENOXAPARIN SODIUM 100 MG/ML
40 INJECTION SUBCUTANEOUS DAILY
Status: DISCONTINUED | OUTPATIENT
Start: 2022-08-18 | End: 2022-08-18 | Stop reason: HOSPADM

## 2022-08-18 RX ORDER — GABAPENTIN 300 MG/1
300 CAPSULE ORAL EVERY 8 HOURS PRN
Status: DISCONTINUED | OUTPATIENT
Start: 2022-08-18 | End: 2022-08-18 | Stop reason: HOSPADM

## 2022-08-18 RX ORDER — SODIUM CHLORIDE 9 MG/ML
INJECTION, SOLUTION INTRAVENOUS CONTINUOUS
Status: DISCONTINUED | OUTPATIENT
Start: 2022-08-18 | End: 2022-08-18 | Stop reason: HOSPADM

## 2022-08-18 RX ORDER — VANCOMYCIN HYDROCHLORIDE 1 G/200ML
15 INJECTION, SOLUTION INTRAVENOUS ONCE
Status: DISCONTINUED | OUTPATIENT
Start: 2022-08-18 | End: 2022-08-18

## 2022-08-18 RX ORDER — PROMETHAZINE HYDROCHLORIDE 25 MG/1
25 TABLET ORAL EVERY 6 HOURS PRN
Status: DISCONTINUED | OUTPATIENT
Start: 2022-08-18 | End: 2022-08-18 | Stop reason: HOSPADM

## 2022-08-18 RX ORDER — ONDANSETRON 4 MG/1
4 TABLET, ORALLY DISINTEGRATING ORAL EVERY 8 HOURS PRN
Status: DISCONTINUED | OUTPATIENT
Start: 2022-08-18 | End: 2022-08-18 | Stop reason: HOSPADM

## 2022-08-18 RX ORDER — VANCOMYCIN HYDROCHLORIDE 1 G/200ML
1000 INJECTION, SOLUTION INTRAVENOUS EVERY 12 HOURS
Status: DISCONTINUED | OUTPATIENT
Start: 2022-08-18 | End: 2022-08-18

## 2022-08-18 RX ORDER — DICYCLOMINE HYDROCHLORIDE 10 MG/1
20 CAPSULE ORAL EVERY 6 HOURS PRN
Status: DISCONTINUED | OUTPATIENT
Start: 2022-08-18 | End: 2022-08-18 | Stop reason: HOSPADM

## 2022-08-18 RX ORDER — SODIUM CHLORIDE 0.9 % (FLUSH) 0.9 %
5-40 SYRINGE (ML) INJECTION EVERY 12 HOURS SCHEDULED
Status: DISCONTINUED | OUTPATIENT
Start: 2022-08-18 | End: 2022-08-18 | Stop reason: HOSPADM

## 2022-08-18 RX ORDER — ONDANSETRON 2 MG/ML
4 INJECTION INTRAMUSCULAR; INTRAVENOUS EVERY 6 HOURS PRN
Status: DISCONTINUED | OUTPATIENT
Start: 2022-08-18 | End: 2022-08-18 | Stop reason: HOSPADM

## 2022-08-18 RX ORDER — HYDROXYZINE 50 MG/1
50 TABLET, FILM COATED ORAL EVERY 8 HOURS PRN
Status: DISCONTINUED | OUTPATIENT
Start: 2022-08-18 | End: 2022-08-18 | Stop reason: HOSPADM

## 2022-08-18 RX ORDER — CLONIDINE HYDROCHLORIDE 0.1 MG/1
0.1 TABLET ORAL EVERY 6 HOURS PRN
Status: DISCONTINUED | OUTPATIENT
Start: 2022-08-18 | End: 2022-08-18 | Stop reason: HOSPADM

## 2022-08-18 RX ORDER — SODIUM CHLORIDE 9 MG/ML
INJECTION, SOLUTION INTRAVENOUS PRN
Status: DISCONTINUED | OUTPATIENT
Start: 2022-08-18 | End: 2022-08-18 | Stop reason: HOSPADM

## 2022-08-18 RX ORDER — MAGNESIUM SULFATE 1 G/100ML
1000 INJECTION INTRAVENOUS PRN
Status: DISCONTINUED | OUTPATIENT
Start: 2022-08-18 | End: 2022-08-18 | Stop reason: HOSPADM

## 2022-08-18 RX ORDER — TRAMADOL HYDROCHLORIDE 50 MG/1
50 TABLET ORAL PRN
Status: DISCONTINUED | OUTPATIENT
Start: 2022-08-18 | End: 2022-08-18 | Stop reason: HOSPADM

## 2022-08-18 RX ORDER — ACETAMINOPHEN 325 MG/1
650 TABLET ORAL EVERY 6 HOURS PRN
Status: DISCONTINUED | OUTPATIENT
Start: 2022-08-18 | End: 2022-08-18 | Stop reason: HOSPADM

## 2022-08-18 RX ORDER — POLYETHYLENE GLYCOL 3350 17 G/17G
17 POWDER, FOR SOLUTION ORAL DAILY PRN
Status: DISCONTINUED | OUTPATIENT
Start: 2022-08-18 | End: 2022-08-18 | Stop reason: HOSPADM

## 2022-08-18 RX ORDER — MORPHINE SULFATE 4 MG/ML
2 INJECTION, SOLUTION INTRAMUSCULAR; INTRAVENOUS EVERY 4 HOURS PRN
Status: DISCONTINUED | OUTPATIENT
Start: 2022-08-18 | End: 2022-08-18 | Stop reason: HOSPADM

## 2022-08-18 RX ORDER — LOPERAMIDE HYDROCHLORIDE 2 MG/1
2 CAPSULE ORAL 4 TIMES DAILY PRN
Status: DISCONTINUED | OUTPATIENT
Start: 2022-08-18 | End: 2022-08-18 | Stop reason: HOSPADM

## 2022-08-18 RX ORDER — AMOXICILLIN AND CLAVULANATE POTASSIUM 875; 125 MG/1; MG/1
1 TABLET, FILM COATED ORAL 2 TIMES DAILY
Qty: 20 TABLET | Refills: 0 | Status: SHIPPED | OUTPATIENT
Start: 2022-08-18 | End: 2022-08-28

## 2022-08-18 RX ORDER — LINEZOLID 600 MG/1
600 TABLET, FILM COATED ORAL EVERY 12 HOURS SCHEDULED
Status: DISCONTINUED | OUTPATIENT
Start: 2022-08-18 | End: 2022-08-18 | Stop reason: HOSPADM

## 2022-08-18 RX ORDER — SODIUM CHLORIDE 0.9 % (FLUSH) 0.9 %
10 SYRINGE (ML) INJECTION PRN
Status: DISCONTINUED | OUTPATIENT
Start: 2022-08-18 | End: 2022-08-18 | Stop reason: HOSPADM

## 2022-08-18 RX ADMIN — SODIUM CHLORIDE: 9 INJECTION, SOLUTION INTRAVENOUS at 05:29

## 2022-08-18 RX ADMIN — Medication 1750 MG: at 05:29

## 2022-08-18 RX ADMIN — PIPERACILLIN AND TAZOBACTAM 3375 MG: 3; .375 INJECTION, POWDER, FOR SOLUTION INTRAVENOUS at 03:03

## 2022-08-18 ASSESSMENT — ENCOUNTER SYMPTOMS
ABDOMINAL DISTENTION: 0
CONSTIPATION: 0
SINUS PRESSURE: 0
EYE ITCHING: 0
NAUSEA: 0
APNEA: 0
SINUS PAIN: 0
EYE PAIN: 0
SHORTNESS OF BREATH: 0
DIARRHEA: 0
ABDOMINAL PAIN: 0
SORE THROAT: 0
EYE DISCHARGE: 0
COUGH: 0
COLOR CHANGE: 0

## 2022-08-18 NOTE — ED PROVIDER NOTES
[facility]     Emergency Department     Faculty Attestation    I performed a history and physical examination of the patient and discussed management with the resident. I reviewed the resident´s note and agree with the documented findings and plan of care. Any areas of disagreement are noted on the chart. I was personally present for the key portions of any procedures. I have documented in the chart those procedures where I was not present during the key portions. I have reviewed the emergency nurses triage note. I agree with the chief complaint, past medical history, past surgical history, allergies, medications, social and family history as documented unless otherwise noted below. For Physician Assistant/ Nurse Practitioner cases/documentation I have personally evaluated this patient and have completed at least one if not all key elements of the E/M (history, physical exam, and MDM). Additional findings are as noted. Cellulitis dorsum right hand, patient has history of IV drug abuse, patient is right-hand dominant.      Long Kat MD  08/18/22 7005

## 2022-08-18 NOTE — ED NOTES
Temp report red'd from [employer], resting quietly on cot without distress noted.       Zaira Reis RN  08/18/22 3538

## 2022-08-18 NOTE — DISCHARGE SUMMARY
[facility]  Office: [address], DO, Mari Din, DO, Lucas Acron, DO, Jaylan Bah Blood, DO, Nicolasa Arzola MD, Ankur Gil MD, Naida Briscoe MD, Pilar Ayala MD,  Stanford Rodriguez MD, Marva Main MD, Juan Rock, DO, Kasandra Loving MD,  Karson Wesley MD, Isaiah Whiting MD, Alfred Correia, DO, Sherrill Hernandez MD, Danika Wong MD, Javier Quiroz MD, Ruddy Hines DO, Dandre Flynn MD, Brodie Everett MD, Samanta Oliver, CNP,  Adelia Alpers, [facility], Ros Hernandes, CNP, Diane Sow, CNP, Eliana Younger PA-C, Connie Serna, [facility], Elmer Gamez, [facility], Gladys Gonzales, CNP, Chiara Howell, CNP, Torito Rizo, CNP, Zuly Alexandra, CNP, Melinda Hester, CNS, Thiago Rm, [facility], James Chan, CNP, Magdy Acevedo, CNP, Evelin Bentley, [facility]    Discharge Summary     Patient ID: Shona Fraire  :  1990   MRN: [id number]     ACCOUNT:  [de-identified]   Patient's PCP: SAMEER Go CNP  Admit Date: 2022   Discharge Date: 2022   Length of Stay: 1  Code Status:  Full Code  Admitting Physician: Sayda Cormier MD  Discharge Physician: SAMEER Mina NP     Active Discharge Diagnoses:     Hospital Problem Lists:  Principal Problem:    Osteomyelitis ([address]  Active Problems:    Cellulitis of right hand    Elevated sed rate    Bandemia    IVDU (intravenous drug user)    CRP elevated  Resolved Problems:    * No resolved hospital problems. *      Admission Condition:  good     Discharged Condition: workup incomplete, patient left [address] Stay:     Hospital Course:  Shona Fraire is a 28 y.o. male who was admitted for the management of   Osteomyelitis [facility]) , presented to ER with Hand Pain (Right hand from iv drug use)      Shona Fraire is a 28 y.o.  with polysubstance abuse including IV injections.  Reported injections into her upper extremities but a couple weeks ago noticed pain in right dorsum region of hand that worsened and prompted patient to seek medical evaluation last week but shortly after admitted he left AMA due to drug dependence. He presents back due to un resolving pain and swelling but with redness on erythema of same hand. Patient denies any fever, chills, night sweats, bowel or bladder changes, chest pain or shortness of breath. Work up and presentation concerning for cellulitis with underling osteomyelitis    Significant therapeutic interventions:     Patient was started on antibiotics for soft tissue infection with osteomyelitis. Follow-up MRI was ordered but unfortunately patient left AMA prior to this being completed. ID was consulted for further assistance with management. Urine drug screen positive for cocaine and marijuana.   Patient also endorses using crack and heroin, cessation was strongly encouraged    Significant Diagnostic Studies:   Labs / Micro:  CBC:   Lab Results   Component Value Date/Time    WBC 13.4 08/18/2022 02:03 AM    RBC 4.10 08/18/2022 02:03 AM    HGB 10.8 08/18/2022 02:03 AM    HCT 36.3 08/18/2022 02:03 AM    MCV 88.5 08/18/2022 02:03 AM    MCH 26.3 08/18/2022 02:03 AM    MCHC 29.8 08/18/2022 02:03 AM    RDW 16.4 08/18/2022 02:03 AM     08/18/2022 02:03 AM     BMP:    Lab Results   Component Value Date/Time    GLUCOSE 81 08/18/2022 02:03 AM     08/18/2022 02:03 AM    K 3.9 08/18/2022 02:03 AM    CL 97 08/18/2022 02:03 AM    CO2 27 08/18/2022 02:03 AM    ANIONGAP 9 08/18/2022 02:03 AM    BUN 11 08/18/2022 02:03 AM    CREATININE 0.90 08/18/2022 02:03 AM    BUNCRER NOT REPORTED 08/26/2021 12:18 AM    CALCIUM 8.8 08/18/2022 02:03 AM    LABGLOM >60 08/18/2022 02:03 AM    GFRAA >60 08/18/2022 02:03 AM    GFR      08/18/2022 02:03 AM     HFP:    Lab Results   Component Value Date/Time    PROT 8.4 08/18/2022 02:03 AM     CMP:    Lab Results   Component Value Date/Time    GLUCOSE 81 08/18/2022 02:03 AM     08/18/2022 02:03 AM    K 3.9 08/18/2022 02:03 AM    CL 97 08/18/2022 02:03 AM    CO2 27 08/18/2022 02:03 AM    BUN 11 08/18/2022 02:03 AM    CREATININE 0.90 08/18/2022 02:03 AM    ANIONGAP 9 08/18/2022 02:03 AM    ALKPHOS 86 08/18/2022 02:03 AM    ALT 10 08/18/2022 02:03 AM    AST 9 08/18/2022 02:03 AM    BILITOT 0.40 08/18/2022 02:03 AM    LABALBU 4.3 08/18/2022 02:03 AM    ALBUMIN 1.0 08/18/2022 02:03 AM    LABGLOM >60 08/18/2022 02:03 AM    GFRAA >60 08/18/2022 02:03 AM    GFR      08/18/2022 02:03 AM    PROT 8.4 08/18/2022 02:03 AM    CALCIUM 8.8 08/18/2022 02:03 AM     PT/INR:    Lab Results   Component Value Date/Time    PROTIME 11.1 08/18/2022 02:03 AM    INR 1.0 08/18/2022 02:03 AM     PTT: No results found for: APTT  FLP:  No results found for: CHOL, TRIG, HDL  U/A:  No results found for: Elder Peers, SPECGRAV, HGBUR, PHUR, PROTEINU, GLUCOSEU, KETUA, BILIRUBINUR, UROBILINOGEN, NITRU, LEUKOCYTESUR  TSH:  No results found for: TSH    Radiology:  XR HAND RIGHT (MIN 3 VIEWS)    Result Date: 8/18/2022  1. Soft tissue swelling in the dorsal right hand is likely due to cellulitis given the clinical findings. There are no findings of underlying necrotizing fasciitis. 2. Osteolysis involving the head of the right 3rd metacarpal could be due to osteomyelitis but could also be seen with an inflammatory arthropathy such as gout. Consider MRI. Consultations:    Consults:     Final Specialist Recommendations/Findings:   IP CONSULT TO HOSPITALIST  IP CONSULT TO INFECTIOUS DISEASES      The patient was seen and examined on day of discharge and this discharge summary is in conjunction with any daily progress note from day of discharge.     Discharge plan:     Disposition: AMA       Medication List        START taking these medications      amoxicillin-clavulanate 875-125 MG per tablet  Commonly known as: AUGMENTIN  Take 1 tablet by mouth 2 times daily for 10 days     doxycycline hyclate 100 MG tablet  Commonly known as: VIBRA-TABS  Take 1 tablet by mouth 2 times daily for 14 days               Where to Get Your Medications        These medications were sent to [address] [place] [address] [address], [place] [address]      Phone: [phone]   amoxicillin-clavulanate 875-125 MG per tablet  doxycycline hyclate 100 MG tablet         No discharge procedures on file. Time Spent on discharge is   10 mins  in patient examination, evaluation, counseling as well as medication reconciliation, prescriptions for required medications, discharge plan and follow up. Electronically signed by   SAMEER Salazar NP  8/18/2022  3:46 PM      Thank you SAMEER Perry - CNP for the opportunity to be involved in this patient's care.

## 2022-08-18 NOTE — ED PROVIDER NOTES
[facility]  ED  Emergency Department Encounter  Emergency Medicine Resident     Pt Bhumika Russell  MRN: [id number]  [place] 1990  Date of evaluation: 8/18/22  PCP:  SAMEER Henson CNP      CHIEF COMPLAINT       No chief complaint on file. HISTORY OF PRESENT ILLNESS  (Location/Symptom, Timing/Onset, Context/Setting, Quality, Duration, Modifying Factors, Severity.)      Perry Correa is a 28 y.o. right handed male who presents with right hand swelling of 3 days duration. He denies any systemic signs including fevers or chills. Difficulty making a fist due to swelling and pain. Medical history includes IV drug use. He was admitted 1 year ago for cellulitis of the left hand and required IV antibiotics. He ultimately chose to leave AMA to use drugs. PAST MEDICAL / SURGICAL / SOCIAL / FAMILY HISTORY      has a past medical history of Polysubstance abuse ([facility] [address] has no past surgical history on file.       Social History     Socioeconomic History    Marital status: Single     Spouse name: Not on file    Number of children: Not on file    Years of education: Not on file    Highest education level: Not on file   Occupational History    Not on file   Tobacco Use    Smoking status: Every Day     Packs/day: 0.50     Types: Cigarettes    Smokeless tobacco: Never   Vaping Use    Vaping Use: Never used   Substance and Sexual Activity    Alcohol use: Not Currently    Drug use: Not Currently     Types: Marijuana [place]), Cocaine, IV     Comment: heroin, last use today 8/24    Sexual activity: Yes     Partners: Female   Other Topics Concern    Not on file   Social History Narrative    Not on file     Social Determinants of Health     Financial Resource Strain: Low Risk     Difficulty of Paying Living Expenses: Not hard at all   Food Insecurity: No Food Insecurity    Worried About [address] in the Last Year: Never true    [facility] in the Last Year: Never true breath sounds. Abdominal:      General: There is no distension. Tenderness: There is no abdominal tenderness. There is no guarding. Musculoskeletal:      Cervical back: No muscular tenderness. Right lower leg: No edema. Left lower leg: No edema. Comments: Right hand as pictured. It is tender to palpation diffusely and indurated worst at the dorsum of the hand, multiple sores. Unable to make a full or even partial fist. Intact radial pulses. Skin:     General: Skin is warm and dry. Capillary Refill: Capillary refill takes less than 2 seconds. Neurological:      General: No focal deficit present. Mental Status: He is alert and oriented to person, place, and time. Mental status is at baseline. DIFFERENTIAL  DIAGNOSIS     PLAN (LABS / IMAGING / EKG):  Orders Placed This Encounter   Procedures    Culture, Blood 1    Culture, Blood 1    XR HAND RIGHT (MIN 3 VIEWS)    CBC with Auto Differential    Comprehensive Metabolic Panel    C-Reactive Protein    Lactate, Sepsis    Magnesium    Protime-INR    Procalcitonin    Sedimentation Rate    Inpatient consult to Hospitalist    ADMIT TO INPATIENT       MEDICATIONS ORDERED:  Orders Placed This Encounter   Medications    vancomycin (VANCOCIN) 1000 mg in dextrose 5% 200 mL IVPB     Order Specific Question:   Antimicrobial Indications     Answer: Other     Order Specific Question:   Other Abx Indication     Answer:   osteo     Order Specific Question:   Suspected Organism(s)     Answer:   osteo    piperacillin-tazobactam (ZOSYN) 3,375 mg in dextrose 5 % 50 mL IVPB (mini-bag)     Order Specific Question:   Antimicrobial Indications     Answer:    Other     Order Specific Question:   Other Abx Indication     Answer:   osteo         DIAGNOSTIC RESULTS / EMERGENCY DEPARTMENT COURSE / MDM   LAB RESULTS:  Results for orders placed or performed during the hospital encounter of 08/18/22   CBC with Auto Differential   Result Value Ref Range WBC 13.4 (H) 3.5 - 11.3 k/uL    RBC 4.10 (L) 4.21 - 5.77 m/uL    Hemoglobin 10.8 (L) 13.0 - 17.0 g/dL    Hematocrit 36.3 (L) 40.7 - 50.3 %    MCV 88.5 82.6 - 102.9 fL    MCH 26.3 25.2 - 33.5 pg    MCHC 29.8 28.4 - 34.8 g/dL    RDW 16.4 (H) 11.8 - 14.4 %    Platelets 701 935 - 339 k/uL    MPV 9.8 8.1 - 13.5 fL    NRBC Automated 0.0 0.0 per 100 WBC    Seg Neutrophils 72 (H) 36 - 65 %    Lymphocytes 20 (L) 24 - 43 %    Monocytes 5 3 - 12 %    Eosinophils % 3 1 - 4 %    Basophils 0 0 - 2 %    Immature Granulocytes 0 0 %    Segs Absolute 9.57 (H) 1.50 - 8.10 k/uL    Absolute Lymph # 2.73 1.10 - 3.70 k/uL    Absolute Mono # 0.67 0.10 - 1.20 k/uL    Absolute Eos # 0.36 0.00 - 0.44 k/uL    Basophils Absolute 0.04 0.00 - 0.20 k/uL    Absolute Immature Granulocyte 0.04 0.00 - 0.30 k/uL    RBC Morphology ANISOCYTOSIS PRESENT    Comprehensive Metabolic Panel   Result Value Ref Range    Glucose 81 70 - 99 mg/dL    BUN 11 6 - 20 mg/dL    Creatinine 0.90 0.70 - 1.20 mg/dL    Calcium 8.8 8.6 - 10.4 mg/dL    Sodium 133 (L) 135 - 144 mmol/L    Potassium 3.9 3.7 - 5.3 mmol/L    Chloride 97 (L) 98 - 107 mmol/L    CO2 27 20 - 31 mmol/L    Anion Gap 9 9 - 17 mmol/L    Alkaline Phosphatase 86 40 - 129 U/L    ALT 10 5 - 41 U/L    AST 9 <40 U/L    Total Bilirubin 0.40 0.3 - 1.2 mg/dL    Total Protein 8.4 (H) 6.4 - 8.3 g/dL    Albumin 4.3 3.5 - 5.2 g/dL    Albumin/Globulin Ratio 1.0 1.0 - 2.5    GFR Non-African American >60 >60 mL/min    GFR African American >60 >60 mL/min    GFR Comment         C-Reactive Protein   Result Value Ref Range    CRP 11.8 (H) 0.0 - 5.0 mg/L   Lactate, Sepsis   Result Value Ref Range    Lactic Acid, Sepsis, Whole Blood 1.1 0.5 - 1.9 mmol/L   Magnesium   Result Value Ref Range    Magnesium 2.0 1.6 - 2.6 mg/dL   Protime-INR   Result Value Ref Range    Protime 11.1 9.1 - 12.3 sec    INR 1.0    Procalcitonin   Result Value Ref Range    Procalcitonin 0.17 (H) <0.09 ng/mL   Sedimentation Rate   Result Value Ref Range Sed Rate 58 (H) 0 - 15 mm/Hr       IMPRESSION: Raymond Trejo is a 28 y.o. manw/hx of IVDU presenting for cellulitis of the irght hand. Not meeting SIRS criteria however based on appearance of this he will require antibiotics. Will obtain XR to assess for soft tissue gas or underlying osteo. He has a hx of leaving AMA and had a similar admission one year ago. Currently agreeable to admit for IV abx. Possible ID and possible hand surgery assessment as well. RADIOLOGY:  XR HAND RIGHT (MIN 3 VIEWS)   Final Result   1. Soft tissue swelling in the dorsal right hand is likely due to cellulitis   given the clinical findings. There are no findings of underlying necrotizing   fasciitis. 2. Osteolysis involving the head of the right 3rd metacarpal could be due to   osteomyelitis but could also be seen with an inflammatory arthropathy such as   gout. Consider MRI. EKG  none    All EKG's are interpreted by the Emergency Department Physician who either signs or Co-signs this chart in the absence of a cardiologist.    EMERGENCY DEPARTMENT COURSE:  Patient seen and evaluated, VSS and nontoxic in appearance. ED Course as of 08/18/22 0300   u Aug 18, 2022   0214 WBC(!): 13.4 [LR]   0215 Hemoglobin Quant(!): 10.8 [LR]   0216 IMPRESSION:  1. Soft tissue swelling in the dorsal right hand is likely due to cellulitis  given the clinical findings. There are no findings of underlying necrotizing  fasciitis. 2. Osteolysis involving the head of the right 3rd metacarpal could be due to  osteomyelitis but could also be seen with an inflammatory arthropathy such as  gout. Consider MRI. [LR]   0216 + leukocytosis but Not meeting SIRS criteria.   [LR]   0224 Sed Rate(!): 58 [LR]   0229 Discussed XR findings w/pt, he is currently agreeable to admission [LR]   0238 BUN,BUNPL: 11 [LR]   0238 Creatinine: 0.90 [LR]   0238 Sodium(!): 133 [LR]   0238 Potassium: 3.9 [LR]   0238 CO2: 27 [LR]   0238 CRP(!): 11.8 [LR]      ED Course User Index  [LR] Thea Solis DO   ED work-up demonstrates leukocytosis, elevated inflammatory markers and x-ray concerning for underlying osteomyelitis of the head of the third metacarpal.  Discussed findings with patient and he is currently agreeable to admission for IV antibiotics. No notes of EC Admission Criteria type on file. PROCEDURES:  none    CONSULTS:  IP CONSULT TO HOSPITALIST  PHARMACY TO DOSE VANCOMYCIN    CRITICAL CARE:  See attendign note        FINAL IMPRESSION      1. Cellulitis of right hand          DISPOSITION / PLAN     DISPOSITION Admitted 08/18/2022 02:59:23 AM      PATIENT REFERRED TO:  No follow-up provider specified.     DISCHARGE MEDICATIONS:  New Prescriptions    No medications on file       Thea Solis DO  Emergency Medicine Resident    (Please note that portions of thisnote were completed with a voice recognition program.  Efforts were made to edit the dictations but occasionally words are mis-transcribed.)       Thea Solis DO  Resident  08/18/22 2916

## 2022-08-18 NOTE — ED PROVIDER NOTES
FACULTY SIGN-OUT  ADDENDUM     Care of this patient was assumed from previous attending physician. The patient's initial evaluation and plan have been discussed with the prior provider who initially evaluated the patient. Attestation  I was available and discussed any additional care issues that arose and coordinated the management plans with the resident(s) caring for the patient during my duty period. Any areas of disagreement with resident's documentation of care or procedures are noted on the chart. I was personally present for the key portions of any/all procedures, during my duty period. I have documented in the chart those procedures where I was not present during the key portions. ED COURSE      The patient was given the following medications:  No orders of the defined types were placed in this encounter. RECENT VITALS:   Temp: 97.9 °F (36.6 °C), Heart Rate: 71,  , BP: 127/81    MEDICAL DECISION MAKING        Perry Correa is a 28 y.o. male who presents to the Emergency Department with complaints of hand cellulitis. Await workup and plan admit.       Denisha Ba MD  Attending Emergency Physician    (Please note that portions of this note were completed with a voice recognition program.  Efforts were made to edit the dictations but occasionally words are mis-transcribed.)                Denisha Ba MD  08/18/22 0147

## 2022-08-18 NOTE — ED NOTES
Pt informed that a room was available for his admission. Pt stated that they were not going to stay for admission. Pt was informed of the necessity of treatment and the severity of his condition, pt still refused. Pt given AMA paper to sign. Pt was told that there are prescriptions available for him at his pharmacy of choice upon discharge.       Jaelyn Ko RN  08/18/22 4137

## 2022-08-18 NOTE — PROGRESS NOTES
[address] [facility] Pharmacokinetic Monitoring Service - Vancomycin     Concetta Miranda is a 28 y.o. male starting on vancomycin therapy for SSTI/Osteomyelitis. Pharmacy consulted by Aidee Daily per NP Sara Barker for monitoring and adjustment. Target Concentration: Goal AUC/ANNIE 400-600 mg*hr/L    Additional Antimicrobials: Zosyn    Pertinent Laboratory Values: Wt Readings from Last 1 Encounters:   08/18/22 160 lb (72.6 kg)     Temp Readings from Last 1 Encounters:   08/18/22 97.9 °F (36.6 °C) (Oral)     Estimated Creatinine Clearance: 121 mL/min (based on SCr of 0.9 mg/dL). Recent Labs     08/18/22  0203   CREATININE 0.90   WBC 13.4*     Procalcitonin:     Pertinent Cultures:  Culture Date Source Results        MRSA Nasal Swab: N/A. Non-respiratory infection.     Plan:  Dosing recommendations based on Bayesian software  Start vancomycin 1750 mg IV x1 dose, followed by vancomycin 1250 mg IV every 12 hours  Anticipated AUC of 548 and trough concentration of 16.3 at steady state  Renal labs as indicated      Pharmacy will continue to monitor patient and adjust therapy as indicated    Thank you for the consult,  Claire Naranjo, [facility]  8/18/2022 3:56 AM

## 2022-08-18 NOTE — ED NOTES
Pj Stokes contacted regarding pt requesting discharge. Due to the need for antibiotics, writer asked for a prescription to be sent to the pt's preferred pharmacy. Order was written. Pt informed.      Anna Marie Pal RN  08/18/22 7894

## 2022-08-18 NOTE — ED NOTES
The following labs labeled with pt sticker and tubed to lab:     [] Blue     [] Lavender   [] on ice  [] Green/yellow  [x] Green/black [x] on ice  [] Yellow  [] Red  [] Pink      [] COVID-19 swab    [] Rapid  [] PCR  [] Flu Swab  [] Strep Swab  [] Peds Viral Panel     [] Urine Sample  [] Pelvic Cultures  [] Blood Cultures   [] Wound Cultures          Merlinda Council, RN  08/18/22 8058

## 2022-08-18 NOTE — CONSULTS
[facility] -   Infectious diseases evaluation  admission date 8/18/2022    reason for consultation:   R hand cellulitis    Impression :   Current:  R hand soft tissue infection and edema  R 3rd Metacarpal bone OM suspected on xray  IVDU active  Bandemia  Elevated CRP and ESR    Other:    Discussion / summary of stay / plan of care     Recommendations   Elevate R hand on a pole  Start zyvox po pend BC  Agree w MRI R hand to confirm OM vs osteoarthritis  Will follow    Infection Control Recommendations   [place] Precautions  Contact Isolation       Antimicrobial Stewardship Recommendations   Simplification of therapy  Targeted therapy      History of Present Illness:   Initial history:  Rodríguez Sanderson is a 28y.o.-year-old male IV drug use, monogamous, single partner for the last 10 years, no recent weight loss and fever, comes in because of right hand swelling and pain has happened now for about 2 weeks progressively, he does not recall if it was directly after a IV drug use in that hand. The patient has fresh needles across the right shin hand and the right wrist, no signs of emboli over the hands and the feet. No shortness of breath or cough no fever, no abdominal pain. No recent weight loss. Denies sharing needles. X-ray showed a possible osteomyelitis at the level of the third metacarpal bone, MRI was suggested    WBC and CRP as below    Very swollen on exam, skin discoloration otherwise. no fluctuation on exam, very tender to touch ago.     Interval changes  8/18/2022   Patient Vitals for the past 8 hrs:   BP Pulse Resp SpO2   08/18/22 1047 120/71 63 17 98 %   08/18/22 0932 119/68 72 15 96 %   08/18/22 0832 (!) 96/46 64 14 100 %   08/18/22 0650 -- 71 13 95 %   08/18/22 0647 119/69 73 12 96 %   08/18/22 0642 -- 65 17 97 %   08/18/22 0632 114/84 67 13 96 %   08/18/22 0617 115/66 74 14 --   08/18/22 0547 113/62 73 12 95 %   08/18/22 0532 111/67 67 11 96 % 08/18/22 0530 119/66 67 13 95 %   08/18/22 0513 -- 70 14 96 %   08/18/22 0407 -- 73 15 --   08/18/22 0404 -- 69 14 --   08/18/22 0401 -- 67 14 100 %   08/18/22 0344 -- 71 16 100 %       Summary of relevant labs:  Labs:  WBC13.4 High    Sed Rate58 High    CRP11.8 High    Creatinine0.90 Total CK29 Low    Procalcitonin0.17 High    Micro:  BC x 2    Imaging:  Xray right hand  Soft tissue swelling in the dorsal right hand is likely due to cellulitis   given the clinical findings. There are no findings of underlying necrotizing   fasciitis. 2. Osteolysis involving the head of the right 3rd metacarpal could be due to   osteomyelitis but could also be seen with an inflammatory arthropathy such as   gout. Consider MRI. I have personally reviewed the past medical history, past surgical history, medications, social history, and family history, and I haveupdated the database accordingly. Allergies:   Benzocaine     Review of Systems:     Review of Systems   Constitutional:  Negative for activity change. HENT:  Negative for congestion. Eyes:  Negative for discharge. Respiratory:  Negative for apnea. Cardiovascular:  Negative for chest pain. Gastrointestinal:  Negative for abdominal distention. Endocrine: Negative for heat intolerance. Genitourinary:  Negative for dysuria. Musculoskeletal:  Positive for arthralgias. Skin:  Negative for color change. Allergic/Immunologic: Negative for immunocompromised state. Neurological:  Negative for dizziness. Hematological:  Negative for adenopathy. Psychiatric/Behavioral:  Negative for agitation. Physical Examination :       Physical Exam  Constitutional:       Appearance: Normal appearance. He is not ill-appearing. HENT:      Head: Normocephalic and atraumatic. Nose: Nose normal. No congestion. Eyes:      General: No scleral icterus. Pupils: Pupils are equal, round, and reactive to light.    Cardiovascular:      Rate and Rhythm: Normal rate and regular rhythm. Heart sounds: Normal heart sounds. No murmur heard. Pulmonary:      Effort: No respiratory distress. Breath sounds: Normal breath sounds. Abdominal:      General: There is no distension. Tenderness: There is no abdominal tenderness. Genitourinary:     Comments: No lynne  Musculoskeletal:         General: No swelling or deformity. Cervical back: Neck supple. No rigidity. Skin:     General: Skin is dry. Coloration: Skin is not jaundiced. Neurological:      General: No focal deficit present. Mental Status: He is alert and oriented to person, place, and time. Cranial Nerves: No cranial nerve deficit. Psychiatric:         Mood and Affect: Mood normal.         Thought Content: Thought content normal.       Past Medical History:     Past Medical History:   Diagnosis Date    Polysubstance abuse ([facility] [address]        Past Surgical  History:   History reviewed. No pertinent surgical history.     Medications:      sodium chloride flush  5-40 mL IntraVENous 2 times per day    enoxaparin  40 mg SubCUTAneous Daily    piperacillin-tazobactam  3,375 mg IntraVENous Q8H    vancomycin  1,250 mg IntraVENous Q12H    vancomycin (VANCOCIN) intermittent dosing (placeholder)   Other RX Placeholder    melatonin  3 mg Oral Nightly       Social History:     Social History     Socioeconomic History    Marital status: Single     Spouse name: Not on file    Number of children: Not on file    Years of education: Not on file    Highest education level: Not on file   Occupational History    Not on file   Tobacco Use    Smoking status: Every Day     Packs/day: 0.50     Types: Cigarettes    Smokeless tobacco: Never   Vaping Use    Vaping Use: Never used   Substance and Sexual Activity    Alcohol use: Not Currently    Drug use: Yes     Types: Marijuana ([address]), Cocaine, IV     Comment: heroin, last use today 8/18/22    Sexual activity: Yes     Partners: Female   Other Topics Concern Not on file   Social History Narrative    Not on file     Social Determinants of Health     Financial Resource Strain: Low Risk     Difficulty of Paying Living Expenses: Not hard at all   Food Insecurity: No Food Insecurity    Worried About Running Out of Food in the Last Year: Never true    Ran Out of Food in the Last Year: Never true   Transportation Needs: Not on file   Physical Activity: Not on file   Stress: Not on file   Social Connections: Not on file   Intimate Partner Violence: Not on file   Housing Stability: Not on file       Family History:   History reviewed. No pertinent family history. Medical Decision Making:   I have independently reviewed/ordered the following labs:    CBC with Differential:   Recent Labs     08/18/22 0203   WBC 13.4*   HGB 10.8*   HCT 36.3*      LYMPHOPCT 20*   MONOPCT 5     BMP:  Recent Labs     08/18/22 0203   *   K 3.9   CL 97*   CO2 27   BUN 11   CREATININE 0.90   MG 2.0     Hepatic Function Panel:   Recent Labs     08/18/22 0203   PROT 8.4*   LABALBU 4.3   BILITOT 0.40   ALKPHOS 86   ALT 10   AST 9     No results for input(s): RPR in the last 72 hours. No results for input(s): HIV in the last 72 hours. No results for input(s): BC in the last 72 hours. Lab Results   Component Value Date/Time    CREATININE 0.90 08/18/2022 02:03 AM    GLUCOSE 81 08/18/2022 02:03 AM       Detailed results: Thank you for allowing us to participate in the care of this patient. Please call with questions. This note is created with the assistance of a speech recognition program.  While intending to generate adocument that actually reflects the content of the visit, the document can still have some errors including those of syntax and sound a like substitutions which may escape proof reading. It such instances, actual meaningcan be extrapolated by contextual diversion.     Rachael Echevarria MD  Office: [phone]  Perfect serve / office [phone]

## 2022-08-18 NOTE — ED NOTES
The following labs labeled with pt sticker and tubed to lab:     [x] Blue     [x] Lavender   [] on ice  [x] Green/yellow  [x] Green/black [] on ice  [x] pedi Yellow  [] Red  [] Pink      [] COVID-19 swab    [] Rapid  [] PCR  [] Flu Swab  [] Strep Swab  [] Peds Viral Panel     [] Urine Sample  [] Pelvic Cultures  [] Blood Cultures   [] Wound Cultures          Sagrario Duran RN  08/18/22 9512

## 2022-08-18 NOTE — H&P
@[facility]@    [facility]    HISTORY AND PHYSICAL EXAMINATION            Date:   8/18/2022  Patient name:  Blossom Isaacs  Date of admission:  8/18/2022 12:24 AM  MRN:   [id number]  Account:  [de-identified]  YOB: 1990  PCP:    SAMEER Morris CNP  Room:   02/02  Code Status:    Full Code    Chief Complaint:     Right hand pain and swelling worse then last week when she presented for similar symptoms but left AMA due to drug dependence. History of Present Illness:     Blossom Isaacs is a 28 y.o.  with polysubstance abuse including IV injections. Reported injections into her upper extremities but a couple weeks ago noticed pain in right dorsum region of hand that worsened and prompted patient to seek medical evaluation last week but shortly after admitted he left AMA due to drug dependence. He presents back due to un resolving pain and swelling but with redness on erythema of same hand. Patient denies any fever, chills, night sweats, bowel or bladder changes, chest pain or shortness of breath. Work up and presentation concerning for cellulitis with underling osteomyelitis    Past Medical History:     Past Medical History:   Diagnosis Date    Polysubstance abuse ([address]       History of significant soft tissue infection of the left hand in the past  Past Surgical History:     History reviewed. No pertinent surgical history. Medications Prior to Admission:     Prior to Admission medications    Not on File        Allergies:     Benzocaine    Social History:     Tobacco:    reports that he has been smoking. He has been smoking an average of .5 packs per day. He has never used smokeless tobacco.  Alcohol:      reports that he does not currently use alcohol. Drug Use:  reports that he does not currently use drugs after having used the following drugs: Marijuana Hector Blow), Cocaine, and IV. Family History:     History reviewed.  No pertinent family history. Review of Systems:     Positive and Negative as described in HPI. ROS negative other than the above noted    Physical Exam:   /81   Pulse 71   Temp 97.9 °F (36.6 °C) (Oral)   Wt 160 lb (72.6 kg)   SpO2 96%   BMI 21.70 kg/m²   Temp (24hrs), Av.9 °F (36.6 °C), Min:97.9 °F (36.6 °C), Max:97.9 °F (36.6 °C)    No results for input(s): POCGLU in the last 72 hours. No intake or output data in the 24 hours ending 22 0406    General Appearance: Chronically ill appearing, disheveled, moderate pain and restless  Mental status: oriented to person, place, and time  Head: normocephalic, atraumatic  Eye: no icterus, redness, pupils equal and reactive, extraocular eye movements intact, conjunctiva clear  Ear: normal external ear, no discharge, hearing intact  Nose: no drainage noted  Mouth: mucous membranes dry  Neck: supple, no carotid bruits, thyroid not palpable  Lungs: Bilateral equal air entry, clear to ausculation, no wheezing, rales or rhonchi, normal effort  Cardiovascular: normal rate, regular rhythm, no murmur, gallop, rub  Abdomen: Soft, nontender, nondistended, normal bowel sounds,   Neurologic: There are no new focal motor or sensory deficits, normal muscle tone and bulk, no abnormal sensation, normal speech, cranial nerves II through XII grossly intact  Skin: Diffuse small lesions concerns were likely track marks, erythema dorsum hand with warmth, tenderness and edema. Extremities: peripheral pulses palpable, sensation intact, reduced ability to  on the right hand.   Psych: Anxious, restless    Investigations:      Laboratory Testing:  Recent Results (from the past 24 hour(s))   CBC with Auto Differential    Collection Time: 22  2:03 AM   Result Value Ref Range    WBC 13.4 (H) 3.5 - 11.3 k/uL    RBC 4.10 (L) 4.21 - 5.77 m/uL    Hemoglobin 10.8 (L) 13.0 - 17.0 g/dL    Hematocrit 36.3 (L) 40.7 - 50.3 %    MCV 88.5 82.6 - 102.9 fL    MCH 26.3 25.2 - 33.5 pg MCHC 29.8 28.4 - 34.8 g/dL    RDW 16.4 (H) 11.8 - 14.4 %    Platelets 454 136 - 115 k/uL    MPV 9.8 8.1 - 13.5 fL    NRBC Automated 0.0 0.0 per 100 WBC    Seg Neutrophils 72 (H) 36 - 65 %    Lymphocytes 20 (L) 24 - 43 %    Monocytes 5 3 - 12 %    Eosinophils % 3 1 - 4 %    Basophils 0 0 - 2 %    Immature Granulocytes 0 0 %    Segs Absolute 9.57 (H) 1.50 - 8.10 k/uL    Absolute Lymph # 2.73 1.10 - 3.70 k/uL    Absolute Mono # 0.67 0.10 - 1.20 k/uL    Absolute Eos # 0.36 0.00 - 0.44 k/uL    Basophils Absolute 0.04 0.00 - 0.20 k/uL    Absolute Immature Granulocyte 0.04 0.00 - 0.30 k/uL    RBC Morphology ANISOCYTOSIS PRESENT    Comprehensive Metabolic Panel    Collection Time: 08/18/22  2:03 AM   Result Value Ref Range    Glucose 81 70 - 99 mg/dL    BUN 11 6 - 20 mg/dL    Creatinine 0.90 0.70 - 1.20 mg/dL    Calcium 8.8 8.6 - 10.4 mg/dL    Sodium 133 (L) 135 - 144 mmol/L    Potassium 3.9 3.7 - 5.3 mmol/L    Chloride 97 (L) 98 - 107 mmol/L    CO2 27 20 - 31 mmol/L    Anion Gap 9 9 - 17 mmol/L    Alkaline Phosphatase 86 40 - 129 U/L    ALT 10 5 - 41 U/L    AST 9 <40 U/L    Total Bilirubin 0.40 0.3 - 1.2 mg/dL    Total Protein 8.4 (H) 6.4 - 8.3 g/dL    Albumin 4.3 3.5 - 5.2 g/dL    Albumin/Globulin Ratio 1.0 1.0 - 2.5    GFR Non-African American >60 >60 mL/min    GFR African American >60 >60 mL/min    GFR Comment         C-Reactive Protein    Collection Time: 08/18/22  2:03 AM   Result Value Ref Range    CRP 11.8 (H) 0.0 - 5.0 mg/L   Lactate, Sepsis    Collection Time: 08/18/22  2:03 AM   Result Value Ref Range    Lactic Acid, Sepsis, Whole Blood 1.1 0.5 - 1.9 mmol/L   Magnesium    Collection Time: 08/18/22  2:03 AM   Result Value Ref Range    Magnesium 2.0 1.6 - 2.6 mg/dL   Protime-INR    Collection Time: 08/18/22  2:03 AM   Result Value Ref Range    Protime 11.1 9.1 - 12.3 sec    INR 1.0    Procalcitonin    Collection Time: 08/18/22  2:03 AM   Result Value Ref Range    Procalcitonin 0.17 (H) <0.09 ng/mL Sedimentation Rate    Collection Time: 08/18/22  2:03 AM   Result Value Ref Range    Sed Rate 58 (H) 0 - 15 mm/Hr   Lactate, Sepsis    Collection Time: 08/18/22  3:42 AM   Result Value Ref Range    Lactic Acid, Sepsis, Whole Blood 1.3 0.5 - 1.9 mmol/L       Imaging/Diagnostics:  XR HAND RIGHT (MIN 3 VIEWS)    Result Date: 8/18/2022  1. Soft tissue swelling in the dorsal right hand is likely due to cellulitis given the clinical findings. There are no findings of underlying necrotizing fasciitis. 2. Osteolysis involving the head of the right 3rd metacarpal could be due to osteomyelitis but could also be seen with an inflammatory arthropathy such as gout. Consider MRI. Assessment :      Hospital Problems             Last Modified POA    * (Principal) Osteomyelitis ([address] 8/18/2022 Yes     #Soft tissue infection of the right hand with Osteomyelitis  -Presenting with infection of right hand from IVDU for several weaks without Abx.  -Pain, swelling, tenderness, warmth and redness of hand predominantly dorsum of hand. No crepitus appreciated. No pain out of proportion, no weeping or draining wounds.   -Labs: BBC 13.4, ESR 58, CRP 11.8  -XR of hand: Soft tissue swelling of dorsum right hand. There is osteolysis involving the head of the right third metacarpal region. PLAN  -will need IV abx, will hold on MRI and treat with prolonged abx for likley osteo. Concern is if she leaves with a PICC line she will likely use IV drugs through this. Will need to consider alternative strategy with help of ID and case mgt.   -Follow up on mrsa nares, blood cultures, trend inflammatory markers. -Broad spectrum abx with staph coverage   -analgesia for pain, PTOT once stable, monitor for necrotizing infection or un resolving symptoms  -neuro vascular assessments  -if condition worsens, add clinda and call surgery for debridement  -as per ID.     #Polysubstance abuse  -Patient's drugs of choice are ibuprofen, cocaine crack, marijuana  -Patient reports using IV heroin prior to admission.  -Monitor for withdrawal, monitor for overdose, monitor visitors with strict limitations and guidelines.  -Counseled importance of drug cessation, appreciate case management recs on possible resources for the patient if she decides she would like to diarrhea.   -Qtc monitoring

## 2022-08-18 NOTE — ED NOTES
Report given to [employer], RN   All questions answered. Pt resting comfortably in bed, on full cardiac monitor. Antibiotics infusing. Call light in reach.       Sharad Porter RN  08/18/22 3655

## 2022-08-18 NOTE — ED NOTES
The following labs labeled with pt sticker and tubed to lab:     [] Blue     [] Lavender   [] on ice  [] Green/yellow  [] Green/black [] on ice  [] Yellow  [] Red  [] Pink      [] COVID-19 swab    [] Rapid  [] PCR  [] Flu Swab  [x] MRSA Swab  [] Peds Viral Panel     [] Urine Sample  [] Pelvic Cultures  [] Blood Cultures   [] Wound Cultures          Sagrario Duran RN  08/18/22 2487

## 2023-01-23 ENCOUNTER — HOSPITAL ENCOUNTER (EMERGENCY)
Age: 33
Discharge: HOME | End: 2023-01-23
Attending: EMERGENCY MEDICINE
Payer: COMMERCIAL

## 2023-01-23 ENCOUNTER — APPOINTMENT (OUTPATIENT)
Dept: GENERAL RADIOLOGY | Age: 33
End: 2023-01-23
Payer: COMMERCIAL

## 2023-01-23 VITALS
DIASTOLIC BLOOD PRESSURE: 75 MMHG | HEART RATE: 79 BPM | OXYGEN SATURATION: 93 % | SYSTOLIC BLOOD PRESSURE: 117 MMHG | TEMPERATURE: 98.2 F | RESPIRATION RATE: 18 BRPM

## 2023-01-23 DIAGNOSIS — L03.113 CELLULITIS OF RIGHT UPPER EXTREMITY: Primary | ICD-10-CM

## 2023-01-23 LAB
ABSOLUTE EOS #: 0.37 K/UL (ref 0–0.44)
ABSOLUTE IMMATURE GRANULOCYTE: 0.04 K/UL (ref 0–0.3)
ABSOLUTE LYMPH #: 2.26 K/UL (ref 1.1–3.7)
ABSOLUTE MONO #: 0.85 K/UL (ref 0.1–1.2)
ANION GAP SERPL CALCULATED.3IONS-SCNC: 8 MMOL/L (ref 9–17)
BASOPHILS # BLD: 0 % (ref 0–2)
BASOPHILS ABSOLUTE: 0.04 K/UL (ref 0–0.2)
BUN BLDV-MCNC: 32 MG/DL (ref 6–20)
C-REACTIVE PROTEIN: 6.3 MG/L (ref 0–5)
CALCIUM SERPL-MCNC: 9 MG/DL (ref 8.6–10.4)
CHLORIDE BLD-SCNC: 102 MMOL/L (ref 98–107)
CO2: 29 MMOL/L (ref 20–31)
CREAT SERPL-MCNC: 0.95 MG/DL (ref 0.7–1.2)
EOSINOPHILS RELATIVE PERCENT: 3 % (ref 1–4)
GFR SERPL CREATININE-BSD FRML MDRD: >60 ML/MIN/1.73M2
GLUCOSE BLD-MCNC: 108 MG/DL (ref 70–99)
HCT VFR BLD CALC: 35.3 % (ref 40.7–50.3)
HEMOGLOBIN: 10.5 G/DL (ref 13–17)
IMMATURE GRANULOCYTES: 0 %
LYMPHOCYTES # BLD: 20 % (ref 24–43)
MCH RBC QN AUTO: 25.8 PG (ref 25.2–33.5)
MCHC RBC AUTO-ENTMCNC: 29.7 G/DL (ref 28.4–34.8)
MCV RBC AUTO: 86.7 FL (ref 82.6–102.9)
MONOCYTES # BLD: 7 % (ref 3–12)
NRBC AUTOMATED: 0 PER 100 WBC
PDW BLD-RTO: 15.2 % (ref 11.8–14.4)
PLATELET # BLD: 344 K/UL (ref 138–453)
PMV BLD AUTO: 9.5 FL (ref 8.1–13.5)
POTASSIUM SERPL-SCNC: 4.6 MMOL/L (ref 3.7–5.3)
RBC # BLD: 4.07 M/UL (ref 4.21–5.77)
RBC # BLD: ABNORMAL 10*6/UL
SEDIMENTATION RATE, ERYTHROCYTE: 43 MM/HR (ref 0–15)
SEG NEUTROPHILS: 70 % (ref 36–65)
SEGMENTED NEUTROPHILS ABSOLUTE COUNT: 7.91 K/UL (ref 1.5–8.1)
SODIUM BLD-SCNC: 139 MMOL/L (ref 135–144)
WBC # BLD: 11.5 K/UL (ref 3.5–11.3)

## 2023-01-23 PROCEDURE — 99284 EMERGENCY DEPT VISIT MOD MDM: CPT

## 2023-01-23 PROCEDURE — 85652 RBC SED RATE AUTOMATED: CPT

## 2023-01-23 PROCEDURE — 96374 THER/PROPH/DIAG INJ IV PUSH: CPT

## 2023-01-23 PROCEDURE — 80048 BASIC METABOLIC PNL TOTAL CA: CPT

## 2023-01-23 PROCEDURE — 6360000002 HC RX W HCPCS: Performed by: STUDENT IN AN ORGANIZED HEALTH CARE EDUCATION/TRAINING PROGRAM

## 2023-01-23 PROCEDURE — 86140 C-REACTIVE PROTEIN: CPT

## 2023-01-23 PROCEDURE — 85025 COMPLETE CBC W/AUTO DIFF WBC: CPT

## 2023-01-23 PROCEDURE — 73130 X-RAY EXAM OF HAND: CPT

## 2023-01-23 PROCEDURE — 6370000000 HC RX 637 (ALT 250 FOR IP): Performed by: STUDENT IN AN ORGANIZED HEALTH CARE EDUCATION/TRAINING PROGRAM

## 2023-01-23 RX ORDER — KETOROLAC TROMETHAMINE 30 MG/ML
30 INJECTION, SOLUTION INTRAMUSCULAR; INTRAVENOUS ONCE
Status: COMPLETED | OUTPATIENT
Start: 2023-01-23 | End: 2023-01-23

## 2023-01-23 RX ORDER — SULFAMETHOXAZOLE AND TRIMETHOPRIM 800; 160 MG/1; MG/1
1 TABLET ORAL 2 TIMES DAILY
Qty: 20 TABLET | Refills: 0 | Status: SHIPPED | OUTPATIENT
Start: 2023-01-23 | End: 2023-02-02

## 2023-01-23 RX ORDER — SULFAMETHOXAZOLE AND TRIMETHOPRIM 800; 160 MG/1; MG/1
1 TABLET ORAL ONCE
Status: COMPLETED | OUTPATIENT
Start: 2023-01-23 | End: 2023-01-23

## 2023-01-23 RX ORDER — ACETAMINOPHEN 500 MG
1000 TABLET ORAL ONCE
Status: COMPLETED | OUTPATIENT
Start: 2023-01-23 | End: 2023-01-23

## 2023-01-23 RX ORDER — NAPROXEN 500 MG/1
500 TABLET ORAL 2 TIMES DAILY WITH MEALS
Qty: 30 TABLET | Refills: 0 | Status: SHIPPED | OUTPATIENT
Start: 2023-01-23

## 2023-01-23 RX ADMIN — SULFAMETHOXAZOLE AND TRIMETHOPRIM 1 TABLET: 800; 160 TABLET ORAL at 17:28

## 2023-01-23 RX ADMIN — KETOROLAC TROMETHAMINE 30 MG: 30 INJECTION, SOLUTION INTRAMUSCULAR at 14:47

## 2023-01-23 RX ADMIN — ACETAMINOPHEN 1000 MG: 500 TABLET ORAL at 13:48

## 2023-01-23 ASSESSMENT — PAIN - FUNCTIONAL ASSESSMENT: PAIN_FUNCTIONAL_ASSESSMENT: 0-10

## 2023-01-23 ASSESSMENT — PAIN DESCRIPTION - ORIENTATION: ORIENTATION: LEFT

## 2023-01-23 ASSESSMENT — ENCOUNTER SYMPTOMS
ABDOMINAL PAIN: 0
FACIAL SWELLING: 0
SHORTNESS OF BREATH: 0

## 2023-01-23 ASSESSMENT — PAIN SCALES - GENERAL: PAINLEVEL_OUTOF10: 7

## 2023-01-23 ASSESSMENT — PAIN DESCRIPTION - LOCATION: LOCATION: HAND

## 2023-01-23 NOTE — ED PROVIDER NOTES
[facility] ED  Emergency Department Encounter  Emergency Medicine Resident     Pt Rebecca Zacarias  MRN: [id number]  [place] 1990  Date of evaluation: 1/23/23  PCP:  No primary care provider on file. Note Started: 6:42 PM EST      CHIEF COMPLAINT       Chief Complaint   Patient presents with    Other     Hand swelling       HISTORY OF PRESENT ILLNESS  (Location/Symptom, Timing/Onset, Context/Setting, Quality, Duration, Modifying Factors, Severity.)      Ramsey Linedr is a 28 y.o. male who presents with right hand swelling ongoing for the past few days. Patient has had hand swelling like this in the past. He was concerned their was a sliver in his finger from grinding metal. Does have some numbness of the finger. Pain with making a fist however he is able. Does report previous IV drug use, however not currently. Rates pain as 7/10. He has not taken any meds PTA. Per chart review, he has MRSA. Had a history of osteomyelitis with hospitalization for IV antibiotics. PAST MEDICAL / SURGICAL / SOCIAL / FAMILY HISTORY      has a past medical history of Polysubstance abuse ([facility] [address]      No PSH    Social History     Socioeconomic History    Marital status: Single     Spouse name: Not on file    Number of children: Not on file    Years of education: Not on file    Highest education level: Not on file   Occupational History    Not on file   Tobacco Use    Smoking status: Every Day     Packs/day: 0.50     Types: Cigarettes    Smokeless tobacco: Never   Vaping Use    Vaping Use: Never used   Substance and Sexual Activity    Alcohol use: Not Currently    Drug use: Yes     Types: Marijuana ([facility]air), Cocaine, IV     Comment: heroin, last use today 8/18/22    Sexual activity: Yes     Partners: Female   Other Topics Concern    Not on file   Social History Narrative    Not on file     Social Determinants of Health     Financial Resource Strain: Low Risk     Difficulty of Paying Living Expenses: Not hard at all   Food Insecurity: No Food Insecurity    Worried About Running Out of Food in the Last Year: Never true    Ran Out of Food in the Last Year: Never true   Transportation Needs: Not on file   Physical Activity: Not on file   Stress: Not on file   Social Connections: Not on file   Intimate Partner Violence: Not on file   Housing Stability: Not on file       No family history on file. Allergies:  Benzocaine    Home Medications:  Prior to Admission medications    Medication Sig Start Date End Date Taking? Authorizing Provider   sulfamethoxazole-trimethoprim (BACTRIM DS;SEPTRA DS) 800-160 MG per tablet Take 1 tablet by mouth 2 times daily for 10 days 1/23/23 2/2/23 Yes Lary Berman,    naproxen (NAPROSYN) 500 MG tablet Take 1 tablet by mouth 2 times daily (with meals) 1/23/23  Yes [place] Saroje, DO          REVIEW OF SYSTEMS       Review of Systems   Constitutional:  Negative for fever. HENT:  Negative for facial swelling. Respiratory:  Negative for shortness of breath. Cardiovascular:  Negative for chest pain. Gastrointestinal:  Negative for abdominal pain. Musculoskeletal:  Positive for arthralgias and joint swelling. Skin:  Negative for wound. Allergic/Immunologic:        Benzocaine allergy   Neurological:  Positive for numbness. Hematological:         - AC   Psychiatric/Behavioral:  Negative for confusion. PHYSICAL EXAM      INITIAL VITALS:   /75   Pulse 79   Temp 98.2 °F (36.8 °C) (Oral)   Resp 18   SpO2 93%     Physical Exam  Constitutional:       General: He is not in acute distress. HENT:      Head: Normocephalic and atraumatic. Right Ear: External ear normal.      Left Ear: External ear normal.      Nose: Nose normal.   Eyes:      Conjunctiva/sclera: Conjunctivae normal.   Cardiovascular:      Rate and Rhythm: Normal rate. Pulses: Normal pulses. Pulmonary:      Effort: Pulmonary effort is normal. No respiratory distress.    Abdominal:      General: Abdomen is flat. There is no distension. Palpations: Abdomen is soft. Tenderness: There is no abdominal tenderness. There is no guarding or rebound. Musculoskeletal:         General: Swelling present. Cervical back: No tenderness. Comments: R hand tenderness to palpation, numbness at the tip of the right middle finger. Cap refill <2. Swelling of dorsum of hand and middle finger as pictured. Skin:     General: Skin is warm. Capillary Refill: Capillary refill takes less than 2 seconds. Neurological:      Mental Status: He is alert and oriented to person, place, and time. Psychiatric:         Mood and Affect: Mood normal.               DDX/DIAGNOSTIC RESULTS / EMERGENCY DEPARTMENT COURSE / MDM     Medical Decision Making  Ddx cellulitis vs osteomyelitis. Will obtain blood work and imaging. Amount and/or Complexity of Data Reviewed  Labs: ordered. Decision-making details documented in ED Course. Radiology: ordered. Decision-making details documented in ED Course. Risk  OTC drugs. Prescription drug management. EMERGENCY DEPARTMENT COURSE:    ED Course as of 01/23/23 1842   Mon Jan 23, 2023   1249 H/o Right upper extremity cellulitis, concern for osteomyelitis presenting with similar hand swelling and significant pain of third digit. [ML]   1249 Numbness of tip of third finger with discoloration. [ML]   1359 No acute bony abnormalities are noted. No radiographic evidence for acute  Osteomyelitis on XR hand. [ML]   1440 WBC(!): 11.5 [ML]   1603 Does have a history of MRSA, will cover with bactrim if CRP or ESR not concerning. [ML]   1649 Sed Rate(!): 43 [ML]   1702 Started bactrim, has PCP follow up. [ML]      ED Course User Index  [ML] Trevon Lindsay DO       CONSULTS:  IP CONSULT TO IV TEAM    FINAL IMPRESSION      1.  Cellulitis of right upper extremity          DISPOSITION / PLAN     DISPOSITION Decision To Discharge 01/23/2023 04:50:04 PM      PATIENT REFERRED TO:  Christiano Crane MD [place]  [facility] [address] [id number]  [phone]    Go on 1/30/2023  10:45 AM      DISCHARGE MEDICATIONS:  Discharge Medication List as of 1/23/2023  4:54 PM        START taking these medications    Details   sulfamethoxazole-trimethoprim (BACTRIM DS;SEPTRA DS) 800-160 MG per tablet Take 1 tablet by mouth 2 times daily for 10 days, Disp-20 tablet, R-0Normal      naproxen (NAPROSYN) 500 MG tablet Take 1 tablet by mouth 2 times daily (with meals), Disp-30 tablet, R-0Normal             Kena Kohler DO  Emergency Medicine Resident    (Please note that portions of thisnote were completed with a voice recognition program.  Efforts were made to edit the dictations but occasionally words are mis-transcribed.)       Kacey Vieyra DO  Resident  01/23/23 4520

## 2023-01-23 NOTE — DISCHARGE INSTRUCTIONS
Thank you for coming to [facility] emergency department! You were seen today for cellulitis (a skin infection), please return for any worsening of symptoms. You were prescribed bactrim, please pick these medications up at the pharmacy. Follow up with your primary care doctor. If you have any worsening of symptoms or any other concerns, please return to the emergency department. We are always available!

## 2023-01-23 NOTE — ED NOTES
New pcp appt scheduled for 1/30/23 @ 10:45am with Sandro Cooley on [facility].      Alexanderu 33, LSW  01/23/23 2902

## 2023-01-23 NOTE — ED PROVIDER NOTES
[address] [facility]     Emergency Department     Faculty Attestation    I performed a history and physical examination of the patient and discussed management with the resident. I have reviewed and agree with the residents findings including all diagnostic interpretations, and treatment plans as written at the time of my review. Any areas of disagreement are noted on the chart. I was personally present for the key portions of any procedures. I have documented in the chart those procedures where I was not present during the key portions. For Physician Assistant/ Nurse Practitioner cases/documentation I have personally evaluated this patient and have completed at least one if not all key elements of the E/M (history, physical exam, and MDM). Additional findings are as noted. Primary Care Physician: No primary care provider on file. Note Started: 12:56 PM EST    History: This is a 28 y.o. male who presents to the Emergency Department with complaint of hand and finger pain. Is been ongoing for the last week. Patient denies any trauma. Denies any fever, chills or sweats. Review of the patient's chart noted he was seen in August 2022 for cellulitis of the right hand. Physical:   oral temperature is 98.2 °F (36.8 °C). His blood pressure is 117/75 and his pulse is 79. His respiration is 18 and oxygen saturation is 93%. Hand is dirty from while however does have 1 spot at the tip of the long finger that is black. He has some obvious swelling to the dorsal aspect of the right hand. It is not erythematous is not warm to the touch. Impression: Right hand and finger pain    Plan: Tray, CBC, BMP, CRP, ESR    BMP shows a mild elevation in the BUN and glucose otherwise fairly unremarkable. CBC shows a mild elevation white 11.5 and some anemia with a hemoglobin of 10.5 however this is consistent with previous hemoglobin of 10.8 on August 18, 2022.   Sed rate is mildly elevated 43 however this is lower than the previous elevation of 58. Medical Decision Making  Amount and/or Complexity of Data Reviewed  External Data Reviewed: notes. Details: Previous admission  Labs: ordered. Decision-making details documented in ED Course. Radiology: ordered. Decision-making details documented in ED Course. Risk  OTC drugs. Prescription drug management. (Please note that portions of this note were completed with a voice recognition program.  Efforts were made to edit the dictations but occasionally words are mis-transcribed.)    Chance Maxwell.  Deepali Mcgregor MD, [facility]  Attending Emergency Medicine Physician        Alysha Kurtz MD  01/23/23 1368

## 2024-04-09 ENCOUNTER — APPOINTMENT (OUTPATIENT)
Dept: GENERAL RADIOLOGY | Age: 34
DRG: 549 | End: 2024-04-09
Payer: COMMERCIAL

## 2024-04-09 ENCOUNTER — HOSPITAL ENCOUNTER (INPATIENT)
Age: 34
LOS: 1 days | Discharge: LEFT BEFORE DISCHARGE | DRG: 549 | End: 2024-04-10
Attending: EMERGENCY MEDICINE | Admitting: STUDENT IN AN ORGANIZED HEALTH CARE EDUCATION/TRAINING PROGRAM
Payer: COMMERCIAL

## 2024-04-09 DIAGNOSIS — M00.9 PYOGENIC ARTHRITIS OF RIGHT ELBOW, DUE TO UNSPECIFIED ORGANISM (HCC): Primary | ICD-10-CM

## 2024-04-09 LAB
ALBUMIN SERPL-MCNC: 4 G/DL (ref 3.5–5.2)
ALBUMIN/GLOB SERPL: 1 {RATIO} (ref 1–2.5)
ALP SERPL-CCNC: 81 U/L (ref 40–129)
ALT SERPL-CCNC: 21 U/L (ref 10–50)
ANION GAP SERPL CALCULATED.3IONS-SCNC: 11 MMOL/L (ref 9–16)
AST SERPL-CCNC: 18 U/L (ref 10–50)
BASOPHILS # BLD: 0.03 K/UL (ref 0–0.2)
BASOPHILS NFR BLD: 0 % (ref 0–2)
BILIRUB SERPL-MCNC: 0.2 MG/DL (ref 0–1.2)
BUN SERPL-MCNC: 9 MG/DL (ref 6–20)
CALCIUM SERPL-MCNC: 8.6 MG/DL (ref 8.6–10.4)
CHLORIDE SERPL-SCNC: 102 MMOL/L (ref 98–107)
CO2 SERPL-SCNC: 24 MMOL/L (ref 20–31)
CREAT SERPL-MCNC: 0.9 MG/DL (ref 0.7–1.2)
CRP SERPL HS-MCNC: 5.4 MG/L (ref 0–5)
EOSINOPHIL # BLD: 0.22 K/UL (ref 0–0.44)
EOSINOPHILS RELATIVE PERCENT: 2 % (ref 1–4)
ERYTHROCYTE [DISTWIDTH] IN BLOOD BY AUTOMATED COUNT: 13.8 % (ref 11.8–14.4)
ERYTHROCYTE [SEDIMENTATION RATE] IN BLOOD BY PHOTOMETRIC METHOD: 24 MM/HR (ref 0–15)
GFR SERPL CREATININE-BSD FRML MDRD: >90 ML/MIN/1.73M2
GLUCOSE SERPL-MCNC: 102 MG/DL (ref 74–99)
HCT VFR BLD AUTO: 36.2 % (ref 40.7–50.3)
HGB BLD-MCNC: 11.1 G/DL (ref 13–17)
IMM GRANULOCYTES # BLD AUTO: 0.04 K/UL (ref 0–0.3)
IMM GRANULOCYTES NFR BLD: 0 %
LACTIC ACID, WHOLE BLOOD: 1.6 MMOL/L (ref 0.7–2.1)
LYMPHOCYTES NFR BLD: 2.19 K/UL (ref 1.1–3.7)
LYMPHOCYTES RELATIVE PERCENT: 19 % (ref 24–43)
MCH RBC QN AUTO: 27.1 PG (ref 25.2–33.5)
MCHC RBC AUTO-ENTMCNC: 30.7 G/DL (ref 28.4–34.8)
MCV RBC AUTO: 88.3 FL (ref 82.6–102.9)
MONOCYTES NFR BLD: 1.19 K/UL (ref 0.1–1.2)
MONOCYTES NFR BLD: 10 % (ref 3–12)
NEUTROPHILS NFR BLD: 69 % (ref 36–65)
NEUTS SEG NFR BLD: 7.99 K/UL (ref 1.5–8.1)
NRBC BLD-RTO: 0 PER 100 WBC
PLATELET # BLD AUTO: 323 K/UL (ref 138–453)
PMV BLD AUTO: 9.5 FL (ref 8.1–13.5)
POTASSIUM SERPL-SCNC: 4.4 MMOL/L (ref 3.7–5.3)
PROT SERPL-MCNC: 7.3 G/DL (ref 6.6–8.7)
RBC # BLD AUTO: 4.1 M/UL (ref 4.21–5.77)
SODIUM SERPL-SCNC: 137 MMOL/L (ref 136–145)
WBC OTHER # BLD: 11.7 K/UL (ref 3.5–11.3)

## 2024-04-09 PROCEDURE — 85652 RBC SED RATE AUTOMATED: CPT

## 2024-04-09 PROCEDURE — 96365 THER/PROPH/DIAG IV INF INIT: CPT

## 2024-04-09 PROCEDURE — 96375 TX/PRO/DX INJ NEW DRUG ADDON: CPT

## 2024-04-09 PROCEDURE — 2580000003 HC RX 258: Performed by: NURSE PRACTITIONER

## 2024-04-09 PROCEDURE — 80053 COMPREHEN METABOLIC PANEL: CPT

## 2024-04-09 PROCEDURE — 73090 X-RAY EXAM OF FOREARM: CPT

## 2024-04-09 PROCEDURE — 93005 ELECTROCARDIOGRAM TRACING: CPT | Performed by: INTERNAL MEDICINE

## 2024-04-09 PROCEDURE — 99284 EMERGENCY DEPT VISIT MOD MDM: CPT

## 2024-04-09 PROCEDURE — 99222 1ST HOSP IP/OBS MODERATE 55: CPT | Performed by: STUDENT IN AN ORGANIZED HEALTH CARE EDUCATION/TRAINING PROGRAM

## 2024-04-09 PROCEDURE — 87040 BLOOD CULTURE FOR BACTERIA: CPT

## 2024-04-09 PROCEDURE — 85025 COMPLETE CBC W/AUTO DIFF WBC: CPT

## 2024-04-09 PROCEDURE — 73060 X-RAY EXAM OF HUMERUS: CPT

## 2024-04-09 PROCEDURE — 80307 DRUG TEST PRSMV CHEM ANLYZR: CPT

## 2024-04-09 PROCEDURE — 6360000002 HC RX W HCPCS

## 2024-04-09 PROCEDURE — 73080 X-RAY EXAM OF ELBOW: CPT

## 2024-04-09 PROCEDURE — 96368 THER/DIAG CONCURRENT INF: CPT

## 2024-04-09 PROCEDURE — 2580000003 HC RX 258

## 2024-04-09 PROCEDURE — 83605 ASSAY OF LACTIC ACID: CPT

## 2024-04-09 PROCEDURE — 86140 C-REACTIVE PROTEIN: CPT

## 2024-04-09 PROCEDURE — 1200000000 HC SEMI PRIVATE

## 2024-04-09 RX ORDER — POTASSIUM CHLORIDE 20 MEQ/1
40 TABLET, EXTENDED RELEASE ORAL PRN
Status: DISCONTINUED | OUTPATIENT
Start: 2024-04-09 | End: 2024-04-10 | Stop reason: HOSPADM

## 2024-04-09 RX ORDER — MAGNESIUM SULFATE 1 G/100ML
1000 INJECTION INTRAVENOUS PRN
Status: DISCONTINUED | OUTPATIENT
Start: 2024-04-09 | End: 2024-04-10 | Stop reason: HOSPADM

## 2024-04-09 RX ORDER — POTASSIUM CHLORIDE 7.45 MG/ML
10 INJECTION INTRAVENOUS PRN
Status: DISCONTINUED | OUTPATIENT
Start: 2024-04-09 | End: 2024-04-10 | Stop reason: HOSPADM

## 2024-04-09 RX ORDER — SODIUM CHLORIDE 0.9 % (FLUSH) 0.9 %
10 SYRINGE (ML) INJECTION PRN
Status: DISCONTINUED | OUTPATIENT
Start: 2024-04-09 | End: 2024-04-10 | Stop reason: HOSPADM

## 2024-04-09 RX ORDER — 0.9 % SODIUM CHLORIDE 0.9 %
1000 INTRAVENOUS SOLUTION INTRAVENOUS ONCE
Status: COMPLETED | OUTPATIENT
Start: 2024-04-09 | End: 2024-04-09

## 2024-04-09 RX ORDER — KETOROLAC TROMETHAMINE 15 MG/ML
15 INJECTION, SOLUTION INTRAMUSCULAR; INTRAVENOUS ONCE
Status: DISCONTINUED | OUTPATIENT
Start: 2024-04-09 | End: 2024-04-10 | Stop reason: HOSPADM

## 2024-04-09 RX ORDER — SODIUM CHLORIDE 0.9 % (FLUSH) 0.9 %
5-40 SYRINGE (ML) INJECTION EVERY 12 HOURS SCHEDULED
Status: DISCONTINUED | OUTPATIENT
Start: 2024-04-09 | End: 2024-04-10 | Stop reason: HOSPADM

## 2024-04-09 RX ORDER — ACETAMINOPHEN 650 MG/1
650 SUPPOSITORY RECTAL EVERY 6 HOURS PRN
Status: DISCONTINUED | OUTPATIENT
Start: 2024-04-09 | End: 2024-04-10 | Stop reason: HOSPADM

## 2024-04-09 RX ORDER — SODIUM CHLORIDE 9 MG/ML
INJECTION, SOLUTION INTRAVENOUS PRN
Status: DISCONTINUED | OUTPATIENT
Start: 2024-04-09 | End: 2024-04-10 | Stop reason: HOSPADM

## 2024-04-09 RX ORDER — ENOXAPARIN SODIUM 100 MG/ML
40 INJECTION SUBCUTANEOUS DAILY
Status: DISCONTINUED | OUTPATIENT
Start: 2024-04-10 | End: 2024-04-10 | Stop reason: HOSPADM

## 2024-04-09 RX ORDER — ACETAMINOPHEN 325 MG/1
650 TABLET ORAL EVERY 6 HOURS PRN
Status: DISCONTINUED | OUTPATIENT
Start: 2024-04-09 | End: 2024-04-10 | Stop reason: HOSPADM

## 2024-04-09 RX ORDER — POLYETHYLENE GLYCOL 3350 17 G/17G
17 POWDER, FOR SOLUTION ORAL DAILY PRN
Status: DISCONTINUED | OUTPATIENT
Start: 2024-04-09 | End: 2024-04-10 | Stop reason: HOSPADM

## 2024-04-09 RX ORDER — ONDANSETRON 2 MG/ML
4 INJECTION INTRAMUSCULAR; INTRAVENOUS EVERY 6 HOURS PRN
Status: DISCONTINUED | OUTPATIENT
Start: 2024-04-09 | End: 2024-04-10 | Stop reason: HOSPADM

## 2024-04-09 RX ORDER — SODIUM CHLORIDE 9 MG/ML
INJECTION, SOLUTION INTRAVENOUS CONTINUOUS
Status: DISCONTINUED | OUTPATIENT
Start: 2024-04-09 | End: 2024-04-10 | Stop reason: HOSPADM

## 2024-04-09 RX ORDER — ONDANSETRON 4 MG/1
4 TABLET, ORALLY DISINTEGRATING ORAL EVERY 8 HOURS PRN
Status: DISCONTINUED | OUTPATIENT
Start: 2024-04-09 | End: 2024-04-10 | Stop reason: HOSPADM

## 2024-04-09 RX ADMIN — CEFEPIME 2000 MG: 2 INJECTION, POWDER, FOR SOLUTION INTRAVENOUS at 20:25

## 2024-04-09 RX ADMIN — SODIUM CHLORIDE: 9 INJECTION, SOLUTION INTRAVENOUS at 23:46

## 2024-04-09 RX ADMIN — VANCOMYCIN HYDROCHLORIDE 1000 MG: 1 INJECTION, POWDER, LYOPHILIZED, FOR SOLUTION INTRAVENOUS at 21:12

## 2024-04-09 RX ADMIN — SODIUM CHLORIDE 1000 ML: 9 INJECTION, SOLUTION INTRAVENOUS at 19:15

## 2024-04-09 RX ADMIN — SODIUM CHLORIDE 1000 ML: 9 INJECTION, SOLUTION INTRAVENOUS at 20:24

## 2024-04-09 ASSESSMENT — ENCOUNTER SYMPTOMS
SHORTNESS OF BREATH: 0
NAUSEA: 0
ABDOMINAL PAIN: 0
BACK PAIN: 0

## 2024-04-09 ASSESSMENT — PAIN - FUNCTIONAL ASSESSMENT: PAIN_FUNCTIONAL_ASSESSMENT: 0-10

## 2024-04-09 ASSESSMENT — PAIN SCALES - GENERAL
PAINLEVEL_OUTOF10: 7
PAINLEVEL_OUTOF10: 7

## 2024-04-09 ASSESSMENT — PAIN DESCRIPTION - LOCATION: LOCATION: ELBOW

## 2024-04-09 NOTE — ED TRIAGE NOTES
Patient presented to the ED today with complaints of an open wound on his elbow from drug use. Patient injected himself with fentanyl at 1711 today. Patient states when he moves his fingers his elbow hurts.

## 2024-04-10 ENCOUNTER — APPOINTMENT (OUTPATIENT)
Dept: MRI IMAGING | Age: 34
DRG: 549 | End: 2024-04-10
Payer: COMMERCIAL

## 2024-04-10 VITALS
WEIGHT: 172.62 LBS | DIASTOLIC BLOOD PRESSURE: 83 MMHG | SYSTOLIC BLOOD PRESSURE: 163 MMHG | RESPIRATION RATE: 20 BRPM | BODY MASS INDEX: 23.38 KG/M2 | TEMPERATURE: 97.2 F | OXYGEN SATURATION: 100 % | HEIGHT: 72 IN | HEART RATE: 60 BPM

## 2024-04-10 LAB
AMPHET UR QL SCN: NEGATIVE
ANION GAP SERPL CALCULATED.3IONS-SCNC: 9 MMOL/L (ref 9–16)
BARBITURATES UR QL SCN: NEGATIVE
BASOPHILS # BLD: 0.03 K/UL (ref 0–0.2)
BASOPHILS NFR BLD: 0 % (ref 0–2)
BENZODIAZ UR QL: NEGATIVE
BUN SERPL-MCNC: 7 MG/DL (ref 6–20)
CALCIUM SERPL-MCNC: 8.1 MG/DL (ref 8.6–10.4)
CANNABINOIDS UR QL SCN: NEGATIVE
CHLORIDE SERPL-SCNC: 107 MMOL/L (ref 98–107)
CO2 SERPL-SCNC: 21 MMOL/L (ref 20–31)
COCAINE UR QL SCN: POSITIVE
CREAT SERPL-MCNC: 0.9 MG/DL (ref 0.7–1.2)
EOSINOPHIL # BLD: 0.16 K/UL (ref 0–0.44)
EOSINOPHILS RELATIVE PERCENT: 2 % (ref 1–4)
ERYTHROCYTE [DISTWIDTH] IN BLOOD BY AUTOMATED COUNT: 13.8 % (ref 11.8–14.4)
FENTANYL UR QL: POSITIVE
GFR SERPL CREATININE-BSD FRML MDRD: >90 ML/MIN/1.73M2
GLUCOSE BLD-MCNC: 132 MG/DL (ref 75–110)
GLUCOSE SERPL-MCNC: 123 MG/DL (ref 74–99)
HCT VFR BLD AUTO: 34.7 % (ref 40.7–50.3)
HGB BLD-MCNC: 10.6 G/DL (ref 13–17)
IMM GRANULOCYTES # BLD AUTO: 0.03 K/UL (ref 0–0.3)
IMM GRANULOCYTES NFR BLD: 0 %
INR PPP: 1.2
LYMPHOCYTES NFR BLD: 2.75 K/UL (ref 1.1–3.7)
LYMPHOCYTES RELATIVE PERCENT: 26 % (ref 24–43)
MCH RBC QN AUTO: 26.8 PG (ref 25.2–33.5)
MCHC RBC AUTO-ENTMCNC: 30.5 G/DL (ref 28.4–34.8)
MCV RBC AUTO: 87.8 FL (ref 82.6–102.9)
METHADONE UR QL: NEGATIVE
MONOCYTES NFR BLD: 1.29 K/UL (ref 0.1–1.2)
MONOCYTES NFR BLD: 12 % (ref 3–12)
NEUTROPHILS NFR BLD: 60 % (ref 36–65)
NEUTS SEG NFR BLD: 6.5 K/UL (ref 1.5–8.1)
NRBC BLD-RTO: 0 PER 100 WBC
OPIATES UR QL SCN: NEGATIVE
OXYCODONE UR QL SCN: NEGATIVE
PCP UR QL SCN: NEGATIVE
PLATELET # BLD AUTO: 292 K/UL (ref 138–453)
PMV BLD AUTO: 9.5 FL (ref 8.1–13.5)
POTASSIUM SERPL-SCNC: 4.2 MMOL/L (ref 3.7–5.3)
PROTHROMBIN TIME: 14.8 SEC (ref 11.7–14.9)
RBC # BLD AUTO: 3.95 M/UL (ref 4.21–5.77)
SODIUM SERPL-SCNC: 137 MMOL/L (ref 136–145)
TEST INFORMATION: ABNORMAL
WBC OTHER # BLD: 10.8 K/UL (ref 3.5–11.3)

## 2024-04-10 PROCEDURE — 85025 COMPLETE CBC W/AUTO DIFF WBC: CPT

## 2024-04-10 PROCEDURE — 80048 BASIC METABOLIC PNL TOTAL CA: CPT

## 2024-04-10 PROCEDURE — 85610 PROTHROMBIN TIME: CPT

## 2024-04-10 PROCEDURE — 82947 ASSAY GLUCOSE BLOOD QUANT: CPT

## 2024-04-10 NOTE — ED NOTES
Admitting service perfect served due to pt ultrasound IV occluding for 2nd time. Pt unable to receive continuous fluids or IV Vancomycin at this time due to no access.  Awaiting order for PICC team consult  
ED HUC notified that IV team has not been at bedside today. ED HUC to reach out to IV team to see where they are at with placing a line in patient.   
Jamia NP at bedside to speak with patient. Pt requesting to leave AMA at this time. Pt A&OX4. Pt states that he understands the risks of leaving and would like to leave anyway. AMA form obtained and patient signed. Writer to place signed AMA form I'm bin.   
MRI checklist completed with pt and updated in pt chart  
MRI tech notified writer that patient is unable to complete MRI at this time. Sahil MRI tech states that patient would not stay still and was requesting to be removed from testing and would like to go home. Dr. Michael notified.   
MRI tech notified writer that they will be taking the patient to testing around 0800.  
Ortho at bedside to evaluate pt  
Pt given boxed lunch and informed of NPO diet at midnight  
Pt refusing to change into gown due to discomfort of arm pain  
Report given to TIANNA Rodas  All questions answered  
Report received from TIANNA Kinney  All questions answered  
The following labs labeled with pt sticker and tubed to lab:     [] Blue     [] Lavender   [] on ice  [] Green/yellow  [] Green/black [] on ice  [] Yellow  [] Red  [] Pink      [] COVID-19 swab    [] Rapid  [] PCR  [] Flu Swab  [] Strep Swab  [] Peds Viral Panel     [x] Urine Sample  [] Pelvic Cultures  [] Blood Cultures   [] Wound Cultures     
The following labs labeled with pt sticker and tubed to lab:     [x] Blue     [x] Lavender   [] on ice  [x] Green/yellow  [] Green/black [] on ice  [] Yellow  [] Red  [] Pink      [] COVID-19 swab    [] Rapid  [] PCR  [] Flu Swab  [] Strep Swab  [] Peds Viral Panel     [] Urine Sample  [] Pelvic Cultures  [] Blood Cultures   [] Wound Cultures     
Writer met with patient regarding concerns related to substance use.  Patient states that he is currently using Fentanyl IV and last used today.  He reports elbow pain following use today.  Patient states that he has been using opiates for 10 years and is tired, \"I've been tired of it,\" but states he is not ready to stop using today.  He declined resources today for help.  Patient encouraged to reach out for help or return to ED when he is ready to stop using for assistance with linkage to treatment.     Social work will remain available as needed.   
XR at bedside  
15 mg    sodium chloride 0.9 % bolus 1,000 mL    ceFEPIme (MAXIPIME) 2,000 mg in sodium chloride 0.9 % 100 mL IVPB (mini-bag)     Order Specific Question:   Antimicrobial Indications     Answer:   Skin and Soft Tissue Infection    vancomycin (VANCOCIN) 1,000 mg in sodium chloride 0.9 % 250 mL IVPB ([id number])     Order Specific Question:   Antimicrobial Indications     Answer:   Bone and Joint Infection    vancomycin (VANCOCIN) intermittent dosing (placeholder)     Order Specific Question:   Antimicrobial Indications     Answer:   Bone and Joint Infection    0.9 % sodium chloride infusion    sodium chloride flush 0.9 % injection 5-40 mL    sodium chloride flush 0.9 % injection 10 mL    0.9 % sodium chloride infusion    OR Linked Order Group     potassium chloride (KLOR-CON M) extended release tablet 40 mEq     potassium bicarb-citric acid (EFFER-K) effervescent tablet 40 mEq     potassium chloride 10 mEq/100 mL IVPB (Peripheral Line)    magnesium sulfate 1000 mg in dextrose 5% 100 mL IVPB    enoxaparin (LOVENOX) injection 40 mg     Order Specific Question:   Indication of Use     Answer:   Prophylaxis-DVT/PE    OR Linked Order Group     ondansetron (ZOFRAN-ODT) disintegrating tablet 4 mg     ondansetron (ZOFRAN) injection 4 mg    polyethylene glycol (GLYCOLAX) packet 17 g    OR Linked Order Group     acetaminophen (TYLENOL) tablet 650 mg     acetaminophen (TYLENOL) suppository 650 mg    ceFEPIme (MAXIPIME) 2,000 mg in sodium chloride 0.9 % 100 mL IVPB (mini-bag)     Order Specific Question:   Antimicrobial Indications     Answer:   Bone and Joint Infection       SURGICAL HISTORY     History reviewed. No pertinent surgical history.    PAST MEDICAL HISTORY       Past Medical History:   Diagnosis Date    Polysubstance abuse (HCC)        Labs:  Labs Reviewed   CBC WITH AUTO DIFFERENTIAL - Abnormal; Notable for the following components:       Result Value    WBC 11.7 (*)     RBC 4.10 (*)     Hemoglobin 11.1 (*)

## 2024-04-10 NOTE — ED PROVIDER NOTES
[facility] ED  Emergency Department  Faculty Sign-Out Addendum     Care of Giles Roberson was assumed from previous attending and is being seen for Arm Pain and Wound Infection  .  The patient's initial evaluation and plan have been discussed with the prior provider who initially evaluated the patient.      I reviewed the resident’s note and agree with the documented findings and plan of care. Any areas of disagreement are noted on the chart. I was personally present for the key portions of any procedures. I have documented in the chart those procedures where I was not present during the key portions. I have reviewed the emergency nurses triage note. I agree with the chief complaint, past medical history, past surgical history, allergies, medications, social and family history as documented unless otherwise noted below.      EMERGENCY DEPARTMENT COURSE / MEDICAL DECISION MAKING:       MEDICATIONS GIVEN:  Orders Placed This Encounter   Medications    sodium chloride 0.9 % bolus 1,000 mL    ketorolac (TORADOL) injection 15 mg    sodium chloride 0.9 % bolus 1,000 mL    ceFEPIme (MAXIPIME) 2,000 mg in sodium chloride 0.9 % 100 mL IVPB (mini-bag)     Order Specific Question:   Antimicrobial Indications     Answer:   Skin and Soft Tissue Infection    vancomycin (VANCOCIN) 1,000 mg in sodium chloride 0.9 % 250 mL IVPB ([id number])     Order Specific Question:   Antimicrobial Indications     Answer:   Bone and Joint Infection    vancomycin (VANCOCIN) intermittent dosing (placeholder)     Order Specific Question:   Antimicrobial Indications     Answer:   Bone and Joint Infection       LABS / RADIOLOGY:     Labs Reviewed   CBC WITH AUTO DIFFERENTIAL - Abnormal; Notable for the following components:       Result Value    WBC 11.7 (*)     RBC 4.10 (*)     Hemoglobin 11.1 (*)     Hematocrit 36.2 (*)     Neutrophils % 69 (*)     Lymphocytes % 19 (*)     All other components within normal limits   COMPREHENSIVE 
     [facility] ED  Emergency Department  Emergency Medicine Resident Sign-out     Care of Giles Roberson was assumed from Dr. Prado and is being seen for Arm Pain and Wound Infection  .  The patient's initial evaluation and plan have been discussed with the prior provider who initially evaluated the patient.     EMERGENCY DEPARTMENT COURSE / MEDICAL DECISION MAKING:       MEDICATIONS GIVEN:  Orders Placed This Encounter   Medications    sodium chloride 0.9 % bolus 1,000 mL    ketorolac (TORADOL) injection 15 mg    sodium chloride 0.9 % bolus 1,000 mL    ceFEPIme (MAXIPIME) 2,000 mg in sodium chloride 0.9 % 100 mL IVPB (mini-bag)     Order Specific Question:   Antimicrobial Indications     Answer:   Skin and Soft Tissue Infection    vancomycin (VANCOCIN) 1,000 mg in sodium chloride 0.9 % 250 mL IVPB ([id number])     Order Specific Question:   Antimicrobial Indications     Answer:   Bone and Joint Infection    vancomycin (VANCOCIN) intermittent dosing (placeholder)     Order Specific Question:   Antimicrobial Indications     Answer:   Bone and Joint Infection    0.9 % sodium chloride infusion    sodium chloride flush 0.9 % injection 5-40 mL    sodium chloride flush 0.9 % injection 10 mL    0.9 % sodium chloride infusion    OR Linked Order Group     potassium chloride (KLOR-CON M) extended release tablet 40 mEq     potassium bicarb-citric acid (EFFER-K) effervescent tablet 40 mEq     potassium chloride 10 mEq/100 mL IVPB (Peripheral Line)    magnesium sulfate 1000 mg in dextrose 5% 100 mL IVPB    enoxaparin (LOVENOX) injection 40 mg     Order Specific Question:   Indication of Use     Answer:   Prophylaxis-DVT/PE    OR Linked Order Group     ondansetron (ZOFRAN-ODT) disintegrating tablet 4 mg     ondansetron (ZOFRAN) injection 4 mg    polyethylene glycol (GLYCOLAX) packet 17 g    OR Linked Order Group     acetaminophen (TYLENOL) tablet 650 mg     acetaminophen (TYLENOL) suppository 650 mg    ceFEPIme 
[facility]     Emergency Department     Faculty Attestation    I performed a history and physical examination of the patient and discussed management with the resident. I reviewed the resident´s note and agree with the documented findings and plan of care. Any areas of disagreement are noted on the chart. I was personally present for the key portions of any procedures. I have documented in the chart those procedures where I was not present during the key portions. I have reviewed the emergency nurses triage note. I agree with the chief complaint, past medical history, past surgical history, allergies, medications, social and family history as documented unless otherwise noted below. For Physician Assistant/ Nurse Practitioner cases/documentation I have personally evaluated this patient and have completed at least one if not all key elements of the E/M (history, physical exam, and MDM). Additional findings are as noted.    Right-hand-dominant, IV injection right arm last 1 hour ago, cellulitis with possible right elbow intra-articular infection.     Luis Carlos Jay MD  04/09/24 0721    
Insecurity: No Food Insecurity (3/7/2022)    Hunger Vital Sign     Worried About Running Out of Food in the Last Year: Never true     Ran Out of Food in the Last Year: Never true   Transportation Needs: Not on file   Physical Activity: Not on file   Stress: Not on file   Social Connections: Not on file   Intimate Partner Violence: Not on file   Housing Stability: Not on file       No family history on file.    Allergies:  Benzocaine    Home Medications:  Prior to Admission medications    Medication Sig Start Date End Date Taking? Authorizing Provider   naproxen (NAPROSYN) 500 MG tablet Take 1 tablet by mouth 2 times daily (with meals) 1/23/23   Lary Berman, DO         REVIEW OF SYSTEMS       Review of Systems   Constitutional:  Negative for chills and fever.   Respiratory:  Negative for shortness of breath.    Cardiovascular:  Negative for chest pain.   Gastrointestinal:  Negative for abdominal pain and nausea.   Musculoskeletal:  Positive for arthralgias. Negative for back pain.   Neurological:  Negative for headaches.       PHYSICAL EXAM      INITIAL VITALS:   /74   Pulse 60   Temp 97.2 °F (36.2 °C) (Oral)   Resp 20   Ht 1.829 m (6')   Wt 78.3 kg (172 lb 9.9 oz)   SpO2 99%   BMI 23.41 kg/m²     Physical Exam  Vitals reviewed.   Constitutional:       General: He is not in acute distress.     Appearance: He is ill-appearing.   HENT:      Head: Normocephalic and atraumatic.   Cardiovascular:      Rate and Rhythm: Regular rhythm. Tachycardia present.   Pulmonary:      Effort: Pulmonary effort is normal.      Breath sounds: Normal breath sounds.   Abdominal:      Palpations: Abdomen is soft.      Tenderness: There is no abdominal tenderness.   Musculoskeletal:      Comments: Significant tenderness to palpation over the right elbow.  Patient almost completely unable to flex or extend the elbow secondary to pain.  Patient does have palpable effusion on the posterior aspect of the elbow, no overlying

## 2024-04-10 NOTE — PROGRESS NOTES
[facility]   Pharmacy Pharmacokinetic Monitoring Service - Vancomycin     Giles Roberson is a 33 y.o. male starting on vancomycin therapy for septic joint. Pharmacy consulted by Dr Prado for monitoring and adjustment.    Target Concentration: Goal AUC/ANNIE 400-600 mg*hr/L    Additional Antimicrobials: cefepime    Pertinent Laboratory Values:   Wt Readings from Last 1 Encounters:   04/09/24 78.3 kg (172 lb 9.9 oz)     Temp Readings from Last 1 Encounters:   04/09/24 97.2 °F (36.2 °C) (Oral)     Estimated Creatinine Clearance: 128 mL/min (based on SCr of 0.9 mg/dL).  Recent Labs     04/09/24  1914   CREATININE 0.9   BUN 9   WBC 11.7*     Procalcitonin: na    Pertinent Cultures:  Culture Date Source Results   4/9 Blood Pending   MRSA Nasal Swab: N/A. Non-respiratory infection.    Plan:  Dosing recommendations based on Bayesian software  Start vancomycin 1000 mg IVPB q8h  Anticipated AUC of 460 and trough concentration of 13 at steady state  Renal labs as indicated   Vancomycin concentration not ordered yet   Pharmacy will continue to monitor patient and adjust therapy as indicated    Thank you for the consult,  Soraida Isaacs RPH  4/9/2024 8:16 PM

## 2024-04-10 NOTE — DISCHARGE SUMMARY
[facility]  Office: [phone]  Rajendra Hinds DO, Will Cox DO, Juno Roland DO, Johny Gayle DO, Brandy Victor MD, Tiffany Michael MD, Aleshia Handy MD, Dana Umanzor MD,  Andrea Caballero MD, Christo Castro MD, Mariam Clemons MD,  Amanda Stauffer DO, Mojgan Gordillo MD, Sunday Lizarraga MD, Lino Hinds DO, Melanie Garner MD,  Brian Siu DO, Isamar De Leon MD, Destini Sweeney MD, Tiffanie Hammer MD, Ravindra Montaño MD,  Kayden Francisco MD, Aubrey Muro MD, Tessie Sanderson MD, Souleymane Gudino MD, Sudheer Allen MD, Anthony Kwon MD, Stephen Christopher DO, Willy Araujo DO, Dashawn Anguiano MD,  Joseph iY MD, Shirley Waterhouse, CNP,  Francia Heaton CNP, Sen Khalil, CNP,  Mary Stanley, SRIRAM, Yin Rdoriguez CNP, Emily Aden CNP, Dorys Tapia CNP, Zayda Tavarez CNP, Eri Savage PA-C, Jacinda Tejeda PA-C, Jamia Shipley, THIERNO, Shelbie Douglass, CNP, Mandeep Barker, CNP, Demetrice Tucker CNP, Yamileth Valentino CNP, Marylu Haywood, CNS, Katie Curran, CNP, Jackie Maguire CNP, Tracy Schwab, CNP         [facility]   IN-PATIENT SERVICE   [facility]    Discharge Summary     Patient ID: Giles Roberson  :  1990   MRN: [id number]     ACCOUNT:  [id number]   Patient's PCP: No primary care provider on file.  Admit Date: 2024   Discharge Date: 4/10/2024     Length of Stay: 1  Code Status:  full code   Admitting Physician: No admitting provider for patient encounter.  Discharge Physician: Jamia Miester, APRN - NP     Active Discharge Diagnoses:     Hospital Problem Lists:  Principal Problem:    Septic arthritis (HCC)  Active Problems:    Elevated sed rate    IVDU (intravenous drug user)    CRP elevated    Polysubstance dependence including opioid drug with daily use (HCC)  Resolved Problems:    * No resolved hospital problems. *      Admission Condition:  poor     Discharged Condition: poor    Hospital Stay:     Hospital Course:  Giles Roberson is a

## 2024-04-10 NOTE — CONSULTS
Orthopaedic Surgery Consult  (Dr. Spencer)      Time of Evaluation: 8:30pm    CC/Reason for consult:  concern for right septic elbow    HPI:      The patient is a 33 y.o. right hand-dominant male who states that he has had approximately 1 week of right elbow pain that acutely worsened approximately 1 day ago.  Patient states that he does use fentanyl IV and that he did inject into the area approximately 2 days ago. Denies any other substance use. Patient's history is inconsistent and difficult to obtain.  He denies any prior injury to the right upper extremity.  He denies any previous abscesses or infections in the upper extremities due to injections.  He does have diffuse ecchymoses and discoloration to the bilateral hands due to scarring from injections.  He denies any current fevers, chills.  Denies any trauma.  Currently holding arm in flexed position at 90 degrees.      Past Medical History:    Past Medical History:   Diagnosis Date    Polysubstance abuse (HCC)      Past Surgical History:    No past surgical history on file.  Medications Prior to Admission:   Prior to Admission medications    Medication Sig Start Date End Date Taking? Authorizing Provider   naproxen (NAPROSYN) 500 MG tablet Take 1 tablet by mouth 2 times daily (with meals) 1/23/23   Lary Berman DO     Allergies:    Benzocaine  Social History:   Social History     Socioeconomic History    Marital status: Single   Tobacco Use    Smoking status: Every Day     Current packs/day: 0.50     Types: Cigarettes    Smokeless tobacco: Never   Vaping Use    Vaping Use: Never used   Substance and Sexual Activity    Alcohol use: Not Currently    Drug use: Yes     Types: Marijuana (Weed), Cocaine, IV     Comment: heroin, last use today 8/18/22    Sexual activity: Yes     Partners: Female     Social Determinants of Health     Financial Resource Strain: Low Risk  (3/7/2022)    Overall Financial Resource Strain ([facility])     Difficulty of Paying Living

## 2024-04-10 NOTE — PROGRESS NOTES
Orthopedic Progress Note    Patient:  Giles Roberson  YOB: 1990     33 y.o. male    Subjective:  Patient seen and examined this morning. Sleeping when ortho team entered room. Left arm IV infiltrated overnight. Pain is well controlled on current regimen.     Vitals reviewed, afebrile    Objective:   Vitals:    04/10/24 0504   BP:    Pulse:    Resp:    Temp:    SpO2: 98%     Gen: NAD, cooperative    Cardiovascular: Regular rate   Respiratory: No acute respiratory distress, breathing comfortably    Orthopedic Exam  RUE: Diffuse scabbing, injection sites, scarring to the hand and forearm.  Significantly tender to any palpation of the arm.   Unwilling to passively or actively move the elbow, wrist, hand.  No significant erythema surrounding the elbow.  Compartments soft and easily compressible. Able to range elbow 45 to 80 this AM. Ulnar/median/AIN/PIN/radial motor intact. Axillary/MCN/median/ulnar/radial nerves SILT. Radial pulse 2+ with BCR, fingers are cool to touch but well perfused.                  Recent Labs     04/10/24  0427   WBC 10.8   HGB 10.6*   HCT 34.7*      INR 1.2      K 4.2   BUN 7   CREATININE 0.9   GLUCOSE 123*      Meds:   Abx: Cefepime, vancomycin  DVT ppx: Lovenox  See rec for complete list    Impression 33 y.o. male who is being seen for:    -Right elbow effusion     Plan  - Follow up MRI results. Keep NPO pending results.  - Antibiotics per primary  - WBAT  to the RUE  - Continue to trend inflammatory trends: CRP 5.4, ESR 24  - Pain control and medical management per primary:    - Ice/Elevate to control pain and edema   - Please page Ortho on call with any questions      Noni Holly DO  Orthopedic Surgery Resident, PGY-2  [place], [place]

## 2024-04-10 NOTE — H&P
[facility]  Office: [phone]  Rajendra Hinds DO, Will Cox DO, Juno Roland DO, Johny Gayle DO, Brandy Victor MD, Tiffany Michael MD, Aleshia Handy MD, Dana Umanzor MD,  Andrea Caballero MD, Christo Castro MD, Willy Araujo DO, Mariam Clemons MD,  Amanda Stauffer DO, Mojgan Gordillo MD, Sunday Lizarraga MD, Lion Hinds DO, Melanie Garner MD, Joseph Yi MD, Brian Siu DO, Isamar De Leon MD, Destini Sweeney MD, Tiffanie Hammer MD, Ravindra Montaño MD,  Stephen Christopher DO, Dashawn Anguiano MD,  Shirley Waterhouse, CNP,  Francia Heaton, CNP, Demetrice Tucker, CNP, Sen Khalil, CNP,  Mary Stanley, SRIRAM, Yin Rodriguez, CNP, Emily Aden, CNP, Yamileth Valentino, CNP, Dorys Tapia, CNP, Zayda Tavarez, CNP, Thom Tejeda PA-C, Marylu Haywood, CNS, Katie Curran, CNP, Jackie Maguire, CNP         [facility]   IN-PATIENT SERVICE   [facility]    HISTORY AND PHYSICAL EXAMINATION            Date:   4/10/2024  Patient name:  Giles Roberson  Date of admission:  4/9/2024  6:22 PM  MRN:   [id number]  Account:  [id number]  YOB: 1990  PCP:    No primary care provider on file.  Room:   47PED/PED  Code Status:    Full Code    Chief Complaint:     Chief Complaint   Patient presents with    Arm Pain    Wound Infection       History Obtained From:     patient    History of Present Illness:     Giles Roberson is a 33 y.o. Declined male who presents with Arm Pain and Wound Infection   and is admitted to the hospital for the management of Septic arthritis (HCC).    33-year-old male with past medical history of IV drug use presented to ED with chief concern of increasing pain and swelling of the right elbow.  Patient injected fentanyl earlier today.  Due to increased pain and swelling he was having difficulty moving his elbow.  Reported chills denies any fever.  Denies any chest pain, shortness of breath, nausea, vomiting.    Patient is currently hemodynamically stable

## 2024-04-11 LAB
EKG ATRIAL RATE: 65 BPM
EKG P AXIS: 31 DEGREES
EKG P-R INTERVAL: 142 MS
EKG Q-T INTERVAL: 394 MS
EKG QRS DURATION: 90 MS
EKG QTC CALCULATION (BAZETT): 409 MS
EKG R AXIS: 62 DEGREES
EKG T AXIS: 31 DEGREES
EKG VENTRICULAR RATE: 65 BPM

## 2024-04-14 LAB
MICROORGANISM SPEC CULT: NORMAL
MICROORGANISM SPEC CULT: NORMAL
SERVICE CMNT-IMP: NORMAL
SERVICE CMNT-IMP: NORMAL
SPECIMEN DESCRIPTION: NORMAL
SPECIMEN DESCRIPTION: NORMAL